# Patient Record
Sex: MALE | Race: WHITE | NOT HISPANIC OR LATINO | Employment: OTHER | URBAN - METROPOLITAN AREA
[De-identification: names, ages, dates, MRNs, and addresses within clinical notes are randomized per-mention and may not be internally consistent; named-entity substitution may affect disease eponyms.]

---

## 2023-09-05 PROBLEM — I10 ESSENTIAL HYPERTENSION: Status: ACTIVE | Noted: 2023-09-05

## 2023-09-05 PROBLEM — R60.9 PERIPHERAL EDEMA: Status: ACTIVE | Noted: 2023-09-05

## 2023-09-05 PROBLEM — I48.91 ATRIAL FIBRILLATION (MULTI): Status: ACTIVE | Noted: 2023-09-05

## 2023-09-05 PROBLEM — I63.9 STROKE (MULTI): Status: ACTIVE | Noted: 2023-09-05

## 2023-09-05 PROBLEM — C61 MALIGNANT TUMOR OF PROSTATE (MULTI): Status: ACTIVE | Noted: 2023-09-05

## 2023-09-05 PROBLEM — R60.0 PERIPHERAL EDEMA: Status: ACTIVE | Noted: 2023-09-05

## 2023-09-05 RX ORDER — METOPROLOL TARTRATE 25 MG/1
0.5 TABLET, FILM COATED ORAL 2 TIMES DAILY
COMMUNITY

## 2023-09-05 RX ORDER — ENOXAPARIN SODIUM 100 MG/ML
70 INJECTION SUBCUTANEOUS 2 TIMES DAILY
COMMUNITY
End: 2023-11-12 | Stop reason: HOSPADM

## 2023-09-05 RX ORDER — ACETAMINOPHEN 325 MG/1
650 TABLET ORAL EVERY 4 HOURS PRN
COMMUNITY

## 2023-09-05 RX ORDER — METRONIDAZOLE 7.5 MG/G
1 GEL TOPICAL 2 TIMES DAILY
COMMUNITY

## 2023-09-05 RX ORDER — WARFARIN 3 MG/1
3 TABLET ORAL
COMMUNITY
End: 2023-11-12 | Stop reason: HOSPADM

## 2023-11-09 ENCOUNTER — APPOINTMENT (OUTPATIENT)
Dept: CARDIOLOGY | Facility: HOSPITAL | Age: 88
DRG: 066 | End: 2023-11-09
Payer: MEDICARE

## 2023-11-09 ENCOUNTER — APPOINTMENT (OUTPATIENT)
Dept: RADIOLOGY | Facility: HOSPITAL | Age: 88
DRG: 066 | End: 2023-11-09
Payer: MEDICARE

## 2023-11-09 ENCOUNTER — HOSPITAL ENCOUNTER (INPATIENT)
Facility: HOSPITAL | Age: 88
LOS: 3 days | Discharge: HOME | DRG: 066 | End: 2023-11-12
Attending: EMERGENCY MEDICINE | Admitting: INTERNAL MEDICINE
Payer: MEDICARE

## 2023-11-09 DIAGNOSIS — I48.20 CHRONIC A-FIB (MULTI): ICD-10-CM

## 2023-11-09 DIAGNOSIS — G45.8 OTHER TRANSIENT CEREBRAL ISCHEMIC ATTACKS AND RELATED SYNDROMES: ICD-10-CM

## 2023-11-09 DIAGNOSIS — R09.89 SUSPECTED CEREBROVASCULAR ACCIDENT (CVA): ICD-10-CM

## 2023-11-09 DIAGNOSIS — I63.9 ACUTE CVA (CEREBROVASCULAR ACCIDENT) (MULTI): Primary | ICD-10-CM

## 2023-11-09 LAB
ALBUMIN SERPL-MCNC: 3.4 G/DL (ref 3.5–5)
ALP BLD-CCNC: 49 U/L (ref 35–125)
ALT SERPL-CCNC: 10 U/L (ref 5–40)
ANION GAP SERPL CALC-SCNC: 8 MMOL/L
APTT PPP: 27.5 SECONDS (ref 22–32.5)
AST SERPL-CCNC: 23 U/L (ref 5–40)
BASOPHILS # BLD AUTO: 0.07 X10*3/UL (ref 0–0.1)
BASOPHILS NFR BLD AUTO: 1.2 %
BILIRUB SERPL-MCNC: 0.6 MG/DL (ref 0.1–1.2)
BUN SERPL-MCNC: 28 MG/DL (ref 8–25)
CALCIUM SERPL-MCNC: 8.6 MG/DL (ref 8.5–10.4)
CHLORIDE SERPL-SCNC: 102 MMOL/L (ref 97–107)
CO2 SERPL-SCNC: 26 MMOL/L (ref 24–31)
CREAT SERPL-MCNC: 0.8 MG/DL (ref 0.4–1.6)
EOSINOPHIL # BLD AUTO: 0.2 X10*3/UL (ref 0–0.4)
EOSINOPHIL NFR BLD AUTO: 3.5 %
ERYTHROCYTE [DISTWIDTH] IN BLOOD BY AUTOMATED COUNT: 13.4 % (ref 11.5–14.5)
GFR SERPL CREATININE-BSD FRML MDRD: 84 ML/MIN/1.73M*2
GLUCOSE BLD MANUAL STRIP-MCNC: 75 MG/DL (ref 74–99)
GLUCOSE SERPL-MCNC: 95 MG/DL (ref 65–99)
HCT VFR BLD AUTO: 35.8 % (ref 41–52)
HGB BLD-MCNC: 11.4 G/DL (ref 13.5–17.5)
IMM GRANULOCYTES # BLD AUTO: 0.01 X10*3/UL (ref 0–0.5)
IMM GRANULOCYTES NFR BLD AUTO: 0.2 % (ref 0–0.9)
INR PPP: 1.2 (ref 0.9–1.2)
LYMPHOCYTES # BLD AUTO: 2.07 X10*3/UL (ref 0.8–3)
LYMPHOCYTES NFR BLD AUTO: 36.4 %
MCH RBC QN AUTO: 30.7 PG (ref 26–34)
MCHC RBC AUTO-ENTMCNC: 31.8 G/DL (ref 32–36)
MCV RBC AUTO: 97 FL (ref 80–100)
MONOCYTES # BLD AUTO: 0.64 X10*3/UL (ref 0.05–0.8)
MONOCYTES NFR BLD AUTO: 11.2 %
NEUTROPHILS # BLD AUTO: 2.7 X10*3/UL (ref 1.6–5.5)
NEUTROPHILS NFR BLD AUTO: 47.5 %
NRBC BLD-RTO: 0 /100 WBCS (ref 0–0)
PLATELET # BLD AUTO: 188 X10*3/UL (ref 150–450)
POCT GLUCOSE: 75 MG/DL (ref 74–99)
POTASSIUM SERPL-SCNC: 4.3 MMOL/L (ref 3.4–5.1)
PROT SERPL-MCNC: 6.5 G/DL (ref 5.9–7.9)
PROTHROMBIN TIME: 12.4 SECONDS (ref 9.3–12.7)
RBC # BLD AUTO: 3.71 X10*6/UL (ref 4.5–5.9)
SODIUM SERPL-SCNC: 136 MMOL/L (ref 133–145)
TROPONIN T SERPL-MCNC: 24 NG/L
WBC # BLD AUTO: 5.7 X10*3/UL (ref 4.4–11.3)

## 2023-11-09 PROCEDURE — 2060000001 HC INTERMEDIATE ICU ROOM DAILY

## 2023-11-09 PROCEDURE — 93880 EXTRACRANIAL BILAT STUDY: CPT | Performed by: INTERNAL MEDICINE

## 2023-11-09 PROCEDURE — 99222 1ST HOSP IP/OBS MODERATE 55: CPT | Performed by: INTERNAL MEDICINE

## 2023-11-09 PROCEDURE — 2500000004 HC RX 250 GENERAL PHARMACY W/ HCPCS (ALT 636 FOR OP/ED): Performed by: INTERNAL MEDICINE

## 2023-11-09 PROCEDURE — 2550000001 HC RX 255 CONTRASTS: Performed by: EMERGENCY MEDICINE

## 2023-11-09 PROCEDURE — 70551 MRI BRAIN STEM W/O DYE: CPT

## 2023-11-09 PROCEDURE — 84484 ASSAY OF TROPONIN QUANT: CPT | Performed by: EMERGENCY MEDICINE

## 2023-11-09 PROCEDURE — 85025 COMPLETE CBC W/AUTO DIFF WBC: CPT | Performed by: EMERGENCY MEDICINE

## 2023-11-09 PROCEDURE — 93880 EXTRACRANIAL BILAT STUDY: CPT

## 2023-11-09 PROCEDURE — 70498 CT ANGIOGRAPHY NECK: CPT

## 2023-11-09 PROCEDURE — 99222 1ST HOSP IP/OBS MODERATE 55: CPT

## 2023-11-09 PROCEDURE — 84155 ASSAY OF PROTEIN SERUM: CPT | Performed by: EMERGENCY MEDICINE

## 2023-11-09 PROCEDURE — 85730 THROMBOPLASTIN TIME PARTIAL: CPT | Performed by: EMERGENCY MEDICINE

## 2023-11-09 PROCEDURE — 36415 COLL VENOUS BLD VENIPUNCTURE: CPT | Performed by: EMERGENCY MEDICINE

## 2023-11-09 PROCEDURE — 70450 CT HEAD/BRAIN W/O DYE: CPT

## 2023-11-09 PROCEDURE — 82947 ASSAY GLUCOSE BLOOD QUANT: CPT

## 2023-11-09 PROCEDURE — 85610 PROTHROMBIN TIME: CPT | Performed by: EMERGENCY MEDICINE

## 2023-11-09 PROCEDURE — 99285 EMERGENCY DEPT VISIT HI MDM: CPT | Mod: 25 | Performed by: EMERGENCY MEDICINE

## 2023-11-09 PROCEDURE — 2500000001 HC RX 250 WO HCPCS SELF ADMINISTERED DRUGS (ALT 637 FOR MEDICARE OP): Performed by: INTERNAL MEDICINE

## 2023-11-09 RX ORDER — ACETAMINOPHEN 325 MG/1
650 TABLET ORAL EVERY 4 HOURS PRN
Status: DISCONTINUED | OUTPATIENT
Start: 2023-11-09 | End: 2023-11-12 | Stop reason: HOSPADM

## 2023-11-09 RX ORDER — PANTOPRAZOLE SODIUM 40 MG/10ML
40 INJECTION, POWDER, LYOPHILIZED, FOR SOLUTION INTRAVENOUS DAILY
Status: DISCONTINUED | OUTPATIENT
Start: 2023-11-09 | End: 2023-11-12 | Stop reason: HOSPADM

## 2023-11-09 RX ORDER — DOCUSATE SODIUM 100 MG/1
100 CAPSULE, LIQUID FILLED ORAL 2 TIMES DAILY
Status: DISCONTINUED | OUTPATIENT
Start: 2023-11-09 | End: 2023-11-12 | Stop reason: HOSPADM

## 2023-11-09 RX ORDER — ICOSAPENT ETHYL 1 G/1
1 CAPSULE ORAL DAILY
Status: DISCONTINUED | OUTPATIENT
Start: 2023-11-09 | End: 2023-11-12 | Stop reason: HOSPADM

## 2023-11-09 RX ORDER — METOPROLOL TARTRATE 25 MG/1
12.5 TABLET, FILM COATED ORAL 2 TIMES DAILY
Status: DISCONTINUED | OUTPATIENT
Start: 2023-11-09 | End: 2023-11-12 | Stop reason: HOSPADM

## 2023-11-09 RX ORDER — PANTOPRAZOLE SODIUM 40 MG/1
40 TABLET, DELAYED RELEASE ORAL DAILY
Status: DISCONTINUED | OUTPATIENT
Start: 2023-11-09 | End: 2023-11-12 | Stop reason: HOSPADM

## 2023-11-09 RX ORDER — ACETAMINOPHEN 325 MG/1
650 TABLET ORAL EVERY 6 HOURS PRN
Status: DISCONTINUED | OUTPATIENT
Start: 2023-11-09 | End: 2023-11-09

## 2023-11-09 RX ADMIN — DOCUSATE SODIUM 100 MG: 100 CAPSULE, LIQUID FILLED ORAL at 20:15

## 2023-11-09 RX ADMIN — PANTOPRAZOLE SODIUM 40 MG: 40 TABLET, DELAYED RELEASE ORAL at 15:15

## 2023-11-09 RX ADMIN — METOPROLOL TARTRATE 12.5 MG: 25 TABLET, FILM COATED ORAL at 15:16

## 2023-11-09 RX ADMIN — APIXABAN 5 MG: 5 TABLET, FILM COATED ORAL at 15:15

## 2023-11-09 RX ADMIN — METOPROLOL TARTRATE 12.5 MG: 25 TABLET, FILM COATED ORAL at 20:14

## 2023-11-09 RX ADMIN — ACETAMINOPHEN 650 MG: 325 TABLET ORAL at 23:09

## 2023-11-09 RX ADMIN — IOHEXOL 75 ML: 350 INJECTION, SOLUTION INTRAVENOUS at 03:35

## 2023-11-09 RX ADMIN — DOCUSATE SODIUM 100 MG: 100 CAPSULE, LIQUID FILLED ORAL at 15:15

## 2023-11-09 RX ADMIN — APIXABAN 5 MG: 5 TABLET, FILM COATED ORAL at 20:15

## 2023-11-09 SDOH — SOCIAL STABILITY: SOCIAL INSECURITY: DO YOU FEEL UNSAFE GOING BACK TO THE PLACE WHERE YOU ARE LIVING?: NO

## 2023-11-09 SDOH — SOCIAL STABILITY: SOCIAL INSECURITY: ABUSE: ADULT

## 2023-11-09 SDOH — SOCIAL STABILITY: SOCIAL INSECURITY: DOES ANYONE TRY TO KEEP YOU FROM HAVING/CONTACTING OTHER FRIENDS OR DOING THINGS OUTSIDE YOUR HOME?: NO

## 2023-11-09 SDOH — SOCIAL STABILITY: SOCIAL INSECURITY: HAS ANYONE EVER THREATENED TO HURT YOUR FAMILY OR YOUR PETS?: NO

## 2023-11-09 SDOH — SOCIAL STABILITY: SOCIAL INSECURITY: ARE YOU OR HAVE YOU BEEN THREATENED OR ABUSED PHYSICALLY, EMOTIONALLY, OR SEXUALLY BY ANYONE?: NO

## 2023-11-09 SDOH — SOCIAL STABILITY: SOCIAL INSECURITY: HAVE YOU HAD THOUGHTS OF HARMING ANYONE ELSE?: NO

## 2023-11-09 SDOH — SOCIAL STABILITY: SOCIAL INSECURITY: DO YOU FEEL ANYONE HAS EXPLOITED OR TAKEN ADVANTAGE OF YOU FINANCIALLY OR OF YOUR PERSONAL PROPERTY?: NO

## 2023-11-09 SDOH — SOCIAL STABILITY: SOCIAL INSECURITY: WERE YOU ABLE TO COMPLETE ALL THE BEHAVIORAL HEALTH SCREENINGS?: YES

## 2023-11-09 SDOH — SOCIAL STABILITY: SOCIAL INSECURITY: ARE THERE ANY APPARENT SIGNS OF INJURIES/BEHAVIORS THAT COULD BE RELATED TO ABUSE/NEGLECT?: NO

## 2023-11-09 ASSESSMENT — ENCOUNTER SYMPTOMS
FREQUENCY: 0
ARTHRALGIAS: 0
EYE DISCHARGE: 0
SLEEP DISTURBANCE: 0
APPETITE CHANGE: 0
BRUISES/BLEEDS EASILY: 0
DIAPHORESIS: 0
NECK PAIN: 0
CHEST TIGHTNESS: 0
SORE THROAT: 0
COUGH: 0
DYSPHORIC MOOD: 0
HEADACHES: 0
MYALGIAS: 0
LIGHT-HEADEDNESS: 0
JOINT SWELLING: 0
FACIAL SWELLING: 0
COLOR CHANGE: 0
RHINORRHEA: 0
SPEECH DIFFICULTY: 0
ACTIVITY CHANGE: 0
FATIGUE: 0
PALPITATIONS: 0
WHEEZING: 0
DIZZINESS: 0
RECTAL PAIN: 0
DIARRHEA: 0
ABDOMINAL PAIN: 0
ABDOMINAL DISTENTION: 0
CONFUSION: 0
POLYDIPSIA: 0
ANAL BLEEDING: 0
PHOTOPHOBIA: 0
EYE REDNESS: 0
NECK STIFFNESS: 0
FLANK PAIN: 0
EYE PAIN: 0
UNEXPECTED WEIGHT CHANGE: 0
HALLUCINATIONS: 0
FEVER: 0
AGITATION: 0
FACIAL ASYMMETRY: 0
SINUS PRESSURE: 0
CHOKING: 0
VOMITING: 0
VOICE CHANGE: 0
DECREASED CONCENTRATION: 0
DYSURIA: 0
WEAKNESS: 1
HYPERACTIVE: 0
NERVOUS/ANXIOUS: 0
DIFFICULTY URINATING: 0
APNEA: 0
CHILLS: 0
BLOOD IN STOOL: 0
EYE ITCHING: 0
ADENOPATHY: 0
SEIZURES: 0
TROUBLE SWALLOWING: 0
TREMORS: 0
STRIDOR: 0
CONSTIPATION: 0
SHORTNESS OF BREATH: 0
SINUS PAIN: 0
NUMBNESS: 0
WOUND: 0
NAUSEA: 0
BACK PAIN: 0
HEMATURIA: 0
POLYPHAGIA: 0

## 2023-11-09 ASSESSMENT — ACTIVITIES OF DAILY LIVING (ADL)
FEEDING YOURSELF: INDEPENDENT
WALKS IN HOME: INDEPENDENT
GROOMING: INDEPENDENT
ADEQUATE_TO_COMPLETE_ADL: YES
HEARING - RIGHT EAR: HEARING AID
JUDGMENT_ADEQUATE_SAFELY_COMPLETE_DAILY_ACTIVITIES: YES
HEARING - LEFT EAR: HEARING AID
DRESSING YOURSELF: INDEPENDENT
TOILETING: INDEPENDENT
PATIENT'S MEMORY ADEQUATE TO SAFELY COMPLETE DAILY ACTIVITIES?: YES
BATHING: INDEPENDENT

## 2023-11-09 ASSESSMENT — COGNITIVE AND FUNCTIONAL STATUS - GENERAL
DAILY ACTIVITIY SCORE: 24
DAILY ACTIVITIY SCORE: 24
MOBILITY SCORE: 24
MOBILITY SCORE: 24
PATIENT BASELINE BEDBOUND: NO

## 2023-11-09 ASSESSMENT — PATIENT HEALTH QUESTIONNAIRE - PHQ9
SUM OF ALL RESPONSES TO PHQ9 QUESTIONS 1 & 2: 0
1. LITTLE INTEREST OR PLEASURE IN DOING THINGS: NOT AT ALL
2. FEELING DOWN, DEPRESSED OR HOPELESS: NOT AT ALL

## 2023-11-09 ASSESSMENT — LIFESTYLE VARIABLES
EVER HAD A DRINK FIRST THING IN THE MORNING TO STEADY YOUR NERVES TO GET RID OF A HANGOVER: NO
HOW OFTEN DO YOU HAVE A DRINK CONTAINING ALCOHOL: 2-4 TIMES A MONTH
SKIP TO QUESTIONS 9-10: 1
AUDIT-C TOTAL SCORE: 2
REASON UNABLE TO ASSESS: NO
HAVE YOU EVER FELT YOU SHOULD CUT DOWN ON YOUR DRINKING: NO
HOW MANY STANDARD DRINKS CONTAINING ALCOHOL DO YOU HAVE ON A TYPICAL DAY: 1 OR 2
HAVE PEOPLE ANNOYED YOU BY CRITICIZING YOUR DRINKING: NO
HOW OFTEN DO YOU HAVE 6 OR MORE DRINKS ON ONE OCCASION: NEVER
EVER FELT BAD OR GUILTY ABOUT YOUR DRINKING: NO
PRESCIPTION_ABUSE_PAST_12_MONTHS: NO
AUDIT-C TOTAL SCORE: 2
SUBSTANCE_ABUSE_PAST_12_MONTHS: NO

## 2023-11-09 ASSESSMENT — COLUMBIA-SUICIDE SEVERITY RATING SCALE - C-SSRS
2. HAVE YOU ACTUALLY HAD ANY THOUGHTS OF KILLING YOURSELF?: NO
6. HAVE YOU EVER DONE ANYTHING, STARTED TO DO ANYTHING, OR PREPARED TO DO ANYTHING TO END YOUR LIFE?: NO
1. IN THE PAST MONTH, HAVE YOU WISHED YOU WERE DEAD OR WISHED YOU COULD GO TO SLEEP AND NOT WAKE UP?: NO

## 2023-11-09 ASSESSMENT — PAIN - FUNCTIONAL ASSESSMENT
PAIN_FUNCTIONAL_ASSESSMENT: 0-10

## 2023-11-09 ASSESSMENT — PAIN SCALES - GENERAL
PAINLEVEL_OUTOF10: 0 - NO PAIN

## 2023-11-09 ASSESSMENT — PAIN DESCRIPTION - PROGRESSION: CLINICAL_PROGRESSION: RAPIDLY IMPROVING

## 2023-11-09 NOTE — CONSULTS
Neurology Consult    History Of Present Illness  Alvin Whitmore is a 89 y.o. male with listed PMHX, coming for evaluation of stroke sympt's.  Hx is obtained from chart and patient.   Pat mentions that last night was watching TV and felt asleep about 11:00 pm feeling ok.  Woke up around 12:00 am noticing severe Lt UE numbness/weakness w/ poor fine motor movements.  He felted that maybe was positional, but sympt's persisted by trying to move UE.  Also noted slurred speech which made him call 911.  Pat was brought to ER by EMS and felted that by time came he was ER, sympt's were progressively improving.  At ER there was noted Lt facial droop that he was not aware of.  He came in stroke code, but found not to be an IV thrombolysis candidate due to chronic intake of Eliquis for atrial fibrillation w/ good compliance.  Denies assoc. HA, n/v, vision changes.  Feels some chest discomfort and subtle SOB.  He has a previous stroke w/ some mild residual Lt hand numbness.       Past Medical History  Past Medical History:   Diagnosis Date    Atrial fibrillation (CMS/HCC)     Cancer (CMS/HCC)     Hypertension     TIA (transient ischemic attack) 01/01/1996     Surgical History  Past Surgical History:   Procedure Laterality Date    MR HEAD ANGIO WO IV CONTRAST  12/21/2017    MR HEAD ANGIO WO IV CONTRAST [facility]     Social History  Social History     Tobacco Use    Smoking status: Never    Smokeless tobacco: Never   Vaping Use    Vaping Use: Never used   Substance Use Topics    Alcohol use: Yes     Alcohol/week: 1.0 standard drink of alcohol     Types: 1 Glasses of wine per week    Drug use: Never     Allergies  Patient has no known allergies.  Medications Prior to Admission   Medication Sig Dispense Refill Last Dose    acetaminophen (Tylenol) 325 mg tablet Take 2 tablets (650 mg) by mouth every 4 hours if needed for mild pain (1 - 3) or fever (temp greater than 38.0 C).       apixaban (Eliquis) 5 mg tablet Take 1  tablet (5 mg) by mouth 2 times a day.   11/8/2023 at 1900    enoxaparin (Lovenox) 80 mg/0.8 mL syringe Inject 0.7 mL (70 mg) under the skin 2 times a day. Additional Instructions: WATCH FOR REDUCED PLATELET COUNTS. CONTACT PRESCRIBER IF PLATELETS ARE TRENDING DOWN FOR AT LEAST 72 HOURS.       fish oil concentrate (Omega-3) 120-180 mg capsule Take 1 capsule (1 g) by mouth once daily.   11/8/2023    metoprolol tartrate (Lopressor) 25 mg tablet Take 0.5 tablets (12.5 mg) by mouth 2 times a day. With food   11/8/2023 at 1900    metroNIDAZOLE (Metrogel) 0.75 % gel 1 Application 2 times a day.       warfarin (Coumadin) 3 mg tablet Take 1 tablet (3 mg) by mouth. Daily          ROS: See hpi and H&P  Neurological Exam  Physical Exam  General:  lying in bed, alone, cooperative to the interview and physical exam, no distress, good historian for age.    Head and Neck:  atraumatic; no carotids bruits audible bilaterally    Heart:  regular sinus rhythm to auscultation.    Mental Status:  Alert, oriented to person, place and time fully, follow simple and complex commands, no right to left confusion, normal speech fluency and comprehension, normal naming and repetition, normal fund of knowledge.     Cranial Nerves II-XII:  positive papillary reactivity to light and accommodation from 3mm to 2 mm bilaterally, normal extraocular movements, no visual field deficits to confrontational testing, normal bilateral facial sensation to light touch at all quadrants, normal jaw muscle strength, normal bilateral facial grimace, decreased hearing to conversation, normal tongue protrusion and palate elevation, normal shoulder shrug.     Motor:  No drift in bilateral upper extremities, muscle strength (MRC score) is 5/5 at both upper and lower extremities, normal tone.   Coordination:  Normal finger to nose and rapid alternating movements to finger tap bilaterally.     Reflexes:  +2/4 at all ext’s; plantar reflex seems to be flexor, but there's  "excessive withdrawal.    Sensory:  Normal light touch in both upper and lower extremities.     Gait: deferred.       Last Recorded Vitals  Blood pressure 114/72, pulse 64, temperature 36.2 °C (97.2 °F), temperature source Temporal, resp. rate 18, height 1.803 m (5' 10.98\"), weight 78.1 kg (172 lb 2.9 oz), SpO2 96 %.    Relevant Results  Results for orders placed or performed during the hospital encounter of 11/09/23 (from the past 96 hour(s))   CBC and Auto Differential   Result Value Ref Range    WBC 5.7 4.4 - 11.3 x10*3/uL    nRBC 0.0 0.0 - 0.0 /100 WBCs    RBC 3.71 (L) 4.50 - 5.90 x10*6/uL    Hemoglobin 11.4 (L) 13.5 - 17.5 g/dL    Hematocrit 35.8 (L) 41.0 - 52.0 %    MCV 97 80 - 100 fL    MCH 30.7 26.0 - 34.0 pg    MCHC 31.8 (L) 32.0 - 36.0 g/dL    RDW 13.4 11.5 - 14.5 %    Platelets 188 150 - 450 x10*3/uL    Neutrophils % 47.5 40.0 - 80.0 %    Immature Granulocytes %, Automated 0.2 0.0 - 0.9 %    Lymphocytes % 36.4 13.0 - 44.0 %    Monocytes % 11.2 2.0 - 10.0 %    Eosinophils % 3.5 0.0 - 6.0 %    Basophils % 1.2 0.0 - 2.0 %    Neutrophils Absolute 2.70 1.60 - 5.50 x10*3/uL    Immature Granulocytes Absolute, Automated 0.01 0.00 - 0.50 x10*3/uL    Lymphocytes Absolute 2.07 0.80 - 3.00 x10*3/uL    Monocytes Absolute 0.64 0.05 - 0.80 x10*3/uL    Eosinophils Absolute 0.20 0.00 - 0.40 x10*3/uL    Basophils Absolute 0.07 0.00 - 0.10 x10*3/uL   Comprehensive metabolic panel   Result Value Ref Range    Glucose 95 65 - 99 mg/dL    Sodium 136 133 - 145 mmol/L    Potassium 4.3 3.4 - 5.1 mmol/L    Chloride 102 97 - 107 mmol/L    Bicarbonate 26 24 - 31 mmol/L    Urea Nitrogen 28 (H) 8 - 25 mg/dL    Creatinine 0.80 0.40 - 1.60 mg/dL    eGFR 84 >60 mL/min/1.73m*2    Calcium 8.6 8.5 - 10.4 mg/dL    Albumin 3.4 (L) 3.5 - 5.0 g/dL    Alkaline Phosphatase 49 35 - 125 U/L    Total Protein 6.5 5.9 - 7.9 g/dL    AST 23 5 - 40 U/L    Bilirubin, Total 0.6 0.1 - 1.2 mg/dL    ALT 10 5 - 40 U/L    Anion Gap 8 <=19 mmol/L   Troponin T, " High Sensitivity   Result Value Ref Range    Troponin T, High Sensitivity 24 (H) <=15 ng/L   POCT GLUCOSE   Result Value Ref Range    POCT Glucose 75 74 - 99 mg/dL   POCT glucose   Result Value Ref Range    POCT Glucose 75 74 - 99 mg/dL   Protime-INR   Result Value Ref Range    Protime 12.4 9.3 - 12.7 seconds    INR 1.2 0.9 - 1.2   APTT   Result Value Ref Range    aPTT 27.5 22.0 - 32.5 seconds     CT brain attack head wo IV contrast  Result Date: 11/9/2023  1. No acute intracranial findings. 2. Remote right posterior parietal lobe infarct.      CT angio head neck w and wo IV contrast  Result Date: 11/9/2023  No aneurysms, stenosis or occlusion of the proximal intracranial vasculature. No carotid or vertebral stenosis.    Carotid duplex bilateral  Result Date: 11/9/2023  PRELIMINARY CONCLUSIONS:  Right Carotid: Findings are consistent with less than 50% stenosis of the right proximal internal carotid artery. Laminar flow seen by color Doppler. Right external carotid artery appears patent with no evidence of stenosis. The right vertebral artery is patent with antegrade flow. No evidence of hemodynamically significant stenosis in the right subclavian artery. Left Carotid: Findings are consistent with less than 50% stenosis of the left proximal internal carotid artery. Left external carotid artery appears patent with no evidence of stenosis. The left vertebral artery is patent with antegrade flow. No evidence of hemodynamically significant stenosis in the left subclavian artery.  Imaging & Doppler Findings: Right Plaque Morph: The distal right common carotid artery demonstrates heterogenous plaque.        Assessment/Plan   Principal Problem:    Acute CVA (cerebrovascular accident) (CMS/HCC)  Active Problems:    Atrial fibrillation (CMS/HCC)    Essential hypertension    Impression:  Stroke-like sympt's in ? If TIA vs small lacunar (sympt's resolved).  TIA/stroke work up in progress.  Will complement w/ some labs.  For  now to cont. Current tx.       Recommendations/Plan:  Check MRI brain w/o cont.   Check b12, tsh, lipids, hgba1c.     3.   Check echo and telemetry (as per primary medical team),   4.   Control of all vascular risk factors, statin and eliquis as outpat.   5.   Avoid hypotension.    6.   DVT prophylaxis as per primary medical team.   7.   Follow to cont.     James Rolon MD

## 2023-11-09 NOTE — H&P
History Of Present Illness  Alvin Whitmore is a 89 y.o. male presenting with left arm weakness.  Patient lives home alone.  He has missed taking Eliquis few times.  He was watching TV and fall asleep.  When he woke up he noticed he could not lift his left arm.  He called 911.  He also noticed some left face numbness and slurred speech.  But his symptoms started resolving by the time he was in the emergency room.  He is now feeling back to his baseline.  He has history of multiple TIAs in the past     Past Medical History  Past Medical History:   Diagnosis Date    Atrial fibrillation (CMS/HCC)     Cancer (CMS/HCC)     Hypertension     TIA (transient ischemic attack) 01/01/1996   History of prostate cancer with seed    Surgical History  Past Surgical History:   Procedure Laterality Date    MR HEAD ANGIO WO IV CONTRAST  12/21/2017    MR HEAD ANGIO WO IV CONTRAST [facility]        Social History  He reports that he has never smoked. He has never used smokeless tobacco. He reports current alcohol use of about 1.0 standard drink of alcohol per week. He reports that he does not use drugs.    Family History  Family History   Problem Relation Name Age of Onset    No Known Problems Mother      No Known Problems Father     Lives alone     Allergies  Patient has no known allergies.    Review of Systems   Constitutional:  Negative for activity change, appetite change, chills, diaphoresis, fatigue, fever and unexpected weight change.   HENT:  Negative for congestion, dental problem, drooling, ear discharge, ear pain, facial swelling, hearing loss, mouth sores, nosebleeds, postnasal drip, rhinorrhea, sinus pressure, sinus pain, sneezing, sore throat, tinnitus, trouble swallowing and voice change.    Eyes:  Negative for photophobia, pain, discharge, redness, itching and visual disturbance.   Respiratory:  Negative for apnea, cough, choking, chest tightness, shortness of breath, wheezing and stridor.    Cardiovascular:   Negative for chest pain, palpitations and leg swelling.   Gastrointestinal:  Negative for abdominal distention, abdominal pain, anal bleeding, blood in stool, constipation, diarrhea, nausea, rectal pain and vomiting.   Endocrine: Negative for cold intolerance, heat intolerance, polydipsia, polyphagia and polyuria.   Genitourinary:  Negative for decreased urine volume, difficulty urinating, dysuria, enuresis, flank pain, frequency, genital sores, hematuria and urgency.   Musculoskeletal:  Negative for arthralgias, back pain, gait problem, joint swelling, myalgias, neck pain and neck stiffness.   Skin:  Negative for color change, pallor, rash and wound.   Allergic/Immunologic: Negative for environmental allergies, food allergies and immunocompromised state.   Neurological:  Positive for weakness. Negative for dizziness, tremors, seizures, syncope, facial asymmetry, speech difficulty, light-headedness, numbness and headaches.   Hematological:  Negative for adenopathy. Does not bruise/bleed easily.   Psychiatric/Behavioral:  Negative for agitation, behavioral problems, confusion, decreased concentration, dysphoric mood, hallucinations, self-injury, sleep disturbance and suicidal ideas. The patient is not nervous/anxious and is not hyperactive.         Physical Exam  Vitals reviewed.   Constitutional:       Appearance: Normal appearance.   HENT:      Head: Normocephalic and atraumatic.      Right Ear: Tympanic membrane, ear canal and external ear normal.      Left Ear: Tympanic membrane, ear canal and external ear normal.      Nose: Nose normal.      Mouth/Throat:      Pharynx: Oropharynx is clear.   Eyes:      Extraocular Movements: Extraocular movements intact.      Conjunctiva/sclera: Conjunctivae normal.      Pupils: Pupils are equal, round, and reactive to light.   Cardiovascular:      Rate and Rhythm: Normal rate and regular rhythm.      Pulses: Normal pulses.      Heart sounds: Normal heart sounds.   Pulmonary:     "  Effort: Pulmonary effort is normal.      Breath sounds: Normal breath sounds.   Abdominal:      General: Abdomen is flat. Bowel sounds are normal.      Palpations: Abdomen is soft.   Musculoskeletal:      Cervical back: Normal range of motion and neck supple.   Skin:     General: Skin is warm and dry.   Neurological:      General: No focal deficit present.      Mental Status: He is alert and oriented to person, place, and time.   Psychiatric:         Mood and Affect: Mood normal.          Last Recorded Vitals  Blood pressure 119/74, pulse 62, temperature 35.6 °C (96.1 °F), temperature source Temporal, resp. rate 15, height 1.803 m (5' 10.98\"), weight 78.1 kg (172 lb 2.9 oz), SpO2 100 %.    Relevant Results        Scheduled medications  apixaban, 5 mg, oral, BID  icosapent ethyL, 1 capsule, oral, Daily  metoprolol tartrate, 12.5 mg, oral, BID      Continuous medications     PRN medications  PRN medications: acetaminophen  Results for orders placed or performed during the hospital encounter of 11/09/23 (from the past 24 hour(s))   CBC and Auto Differential   Result Value Ref Range    WBC 5.7 4.4 - 11.3 x10*3/uL    nRBC 0.0 0.0 - 0.0 /100 WBCs    RBC 3.71 (L) 4.50 - 5.90 x10*6/uL    Hemoglobin 11.4 (L) 13.5 - 17.5 g/dL    Hematocrit 35.8 (L) 41.0 - 52.0 %    MCV 97 80 - 100 fL    MCH 30.7 26.0 - 34.0 pg    MCHC 31.8 (L) 32.0 - 36.0 g/dL    RDW 13.4 11.5 - 14.5 %    Platelets 188 150 - 450 x10*3/uL    Neutrophils % 47.5 40.0 - 80.0 %    Immature Granulocytes %, Automated 0.2 0.0 - 0.9 %    Lymphocytes % 36.4 13.0 - 44.0 %    Monocytes % 11.2 2.0 - 10.0 %    Eosinophils % 3.5 0.0 - 6.0 %    Basophils % 1.2 0.0 - 2.0 %    Neutrophils Absolute 2.70 1.60 - 5.50 x10*3/uL    Immature Granulocytes Absolute, Automated 0.01 0.00 - 0.50 x10*3/uL    Lymphocytes Absolute 2.07 0.80 - 3.00 x10*3/uL    Monocytes Absolute 0.64 0.05 - 0.80 x10*3/uL    Eosinophils Absolute 0.20 0.00 - 0.40 x10*3/uL    Basophils Absolute 0.07 0.00 - " 0.10 x10*3/uL   Comprehensive metabolic panel   Result Value Ref Range    Glucose 95 65 - 99 mg/dL    Sodium 136 133 - 145 mmol/L    Potassium 4.3 3.4 - 5.1 mmol/L    Chloride 102 97 - 107 mmol/L    Bicarbonate 26 24 - 31 mmol/L    Urea Nitrogen 28 (H) 8 - 25 mg/dL    Creatinine 0.80 0.40 - 1.60 mg/dL    eGFR 84 >60 mL/min/1.73m*2    Calcium 8.6 8.5 - 10.4 mg/dL    Albumin 3.4 (L) 3.5 - 5.0 g/dL    Alkaline Phosphatase 49 35 - 125 U/L    Total Protein 6.5 5.9 - 7.9 g/dL    AST 23 5 - 40 U/L    Bilirubin, Total 0.6 0.1 - 1.2 mg/dL    ALT 10 5 - 40 U/L    Anion Gap 8 <=19 mmol/L   Troponin T, High Sensitivity   Result Value Ref Range    Troponin T, High Sensitivity 24 (H) <=15 ng/L   POCT GLUCOSE   Result Value Ref Range    POCT Glucose 75 74 - 99 mg/dL   POCT glucose   Result Value Ref Range    POCT Glucose 75 74 - 99 mg/dL   Protime-INR   Result Value Ref Range    Protime 12.4 9.3 - 12.7 seconds    INR 1.2 0.9 - 1.2   APTT   Result Value Ref Range    aPTT 27.5 22.0 - 32.5 seconds     CT brain attack head wo IV contrast    Result Date: 11/9/2023  Interpreted By:  Winston Latif, STUDY: CT BRAIN ATTACK HEAD WO IV CONTRAST; 11/9/2023 3:33 am   INDICATION: Signs/Symptoms:Stroke Evaluation; /left-sided weakness and slurred speech/history of TIA/brain attack   COMPARISON: 12/20/2017   ACCESSION NUMBER(S): [id number]   ORDERING CLINICIAN: RICHARD MORROW   TECHNIQUE: Contiguous axial images were acquired from the vertex through the posterior fossa without IV contrast.   All CT examinations are performed with one or more of the following dose reduction techniques: Automated Exposure Control, adjustment of mA and/or kV according to patient size, or use of iterative reconstruction techniques.   FINDINGS: No focal mass effect or midline shift is identified. The ventricles and sulci are symmetric and appropriate for the patient's age. However, chronic involutional change of the cerebral hemispheres is noted.    Encephalomalacia is seen the posterior right parietal region likely from the sequela of remote infarct. Nonspecific hypodensities are identified within the periventricular and subcortical white matter likely due to chronic postischemic white matter disease. Atherosclerotic calcifications are seen within the carotid siphons and vertebral arteries.   No acute intracranial hemorrhage is identified. No intra-axial or extra-axial fluid collection is seen.   The visualized paranasal sinuses and mastoid air cells are clear.       1. No acute intracranial findings. 2. Remote right posterior parietal lobe infarct.   . .   This report is in agreement with the preliminary report issued by [facility].   MACRO: None   Signed by: Winston Latif 11/9/2023 8:12 AM Dictation workstation:   [id number]    CT angio head neck w and wo IV contrast    Result Date: 11/9/2023  Interpreted By:  Maddy Owens, STUDY: CT ANGIO HEAD NECK W AND WO IV CONTRAST;  11/9/2023 3:34 am   INDICATION: Signs/Symptoms:Stroke Evaluation with VAN Positive.   COMPARISON: Head CT dated same day.   ACCESSION NUMBER(S): [id number]   ORDERING CLINICIAN: RICHARD MORROW   TECHNIQUE: Unenhanced CT images of the head were obtained. Subsequently,   was administered intravenously and axial images of the head and neck were acquired.  Coronal, sagittal, and 3-D reconstructions were provided for review.   FINDINGS:     CTA HEAD FINDINGS:   No aneurysms, stenosis or occlusion of the proximal intracranial vasculature.   CTA NECK FINDINGS:   No carotid or vertebral stenosis.   Soft tissues of the neck do not demonstrate any suspicious findings. There are diffuse ground-glass opacities involving the lung apices.   Paranasal sinuses and bilateral mastoid air cells are clear.       No aneurysms, stenosis or occlusion of the proximal intracranial vasculature. No carotid or vertebral stenosis.   MACRO: None   Signed by: Maddy Owens 11/9/2023 8:12 AM Dictation  workstation:   [id number]        Assessment/Plan   Principal Problem:    Acute CVA (cerebrovascular accident) (CMS/HCC)  Active Problems:    Atrial fibrillation (CMS/HCC)    Essential hypertension      MRI of the brain  Ultrasound carotid  2D echo  Cardiology consult  Neurology consult  Home medications  DVT prophylaxis patient already on Eliqui       I spent  minutes in the professional and overall care of this patient.      Kati Langley MD

## 2023-11-09 NOTE — CONSULTS
"Inpatient consult to Cardiology  Consult performed by: LONDON Hyatt-CNP  Consult ordered by: Kati Langley MD  Reason for consult: CVA        History Of Present Illness:    Alvin Whitmore is a 89 y.o. male presenting with left sided weakness and numbness.  Patient is current with Dr. Gleason for cardiology.  He has a past medical history of atrial fibrillation, hypertension, TIA, and prostate cancer.  Patient currently takes Eliquis for stroke risk reduction and states that he has missed a few doses of Eliquis recently.  Last night he began having numbness and weakness in his left arm.  Lab work was completed in the emergency department showing a sodium of 136, potassium of 4.3, creatinine of 0.80 and hemoglobin of 11.4.  His troponin was slightly elevated at 24.  CT of the head showed no acute intracranial processes.  EKG completed in the emergency department showed a slow atrial fibrillation with a ventricular rate of 59 and no acute ST or T wave changes.  Patient was admitted to the hospital for further assessment and treatment.     Last Recorded Vitals:  Vitals:    11/09/23 0857 11/09/23 1017 11/09/23 1142 11/09/23 1227   BP: 141/73 122/75 129/78 119/74   BP Location:   Left arm Left arm   Patient Position:   Lying Lying   Pulse: 57 60 78 62   Resp: 18 18 17 15   Temp:  37 °C (98.6 °F) 36.5 °C (97.7 °F) 35.6 °C (96.1 °F)   TempSrc:   Oral Temporal   SpO2: 98% 98%  100%   Weight:   78.1 kg (172 lb 2.9 oz)    Height:   1.803 m (5' 10.98\")        Last Labs:  CBC - 11/9/2023:  3:28 AM  5.7 11.4 188    35.8      CMP - 11/9/2023:  3:28 AM  8.6 6.5 23 --- 0.6   _ 3.4 10 49      PTT - 11/9/2023:  6:41 AM  1.2   12.4 27.5     No results found for: \"TROPHS\", \"BNP\", \"HGBA1C\", \"LDLCALC\", \"VLDL\"   Last I/O:  No intake/output data recorded.      Past Medical History:  He has a past medical history of Atrial fibrillation (CMS/HCC), Cancer (CMS/HCC), Hypertension, and TIA (transient ischemic attack) " (01/01/1996).    Past Surgical History:  He has a past surgical history that includes MR angio head wo IV contrast (12/21/2017).      Social History:  He reports that he has never smoked. He has never used smokeless tobacco. He reports current alcohol use of about 1.0 standard drink of alcohol per week. He reports that he does not use drugs.    Family History:  Family History   Problem Relation Name Age of Onset    No Known Problems Mother      No Known Problems Father          Allergies:  Patient has no known allergies.    Inpatient Medications:  Scheduled medications   Medication Dose Route Frequency    apixaban  5 mg oral BID    docusate sodium  100 mg oral BID    icosapent ethyL  1 capsule oral Daily    metoprolol tartrate  12.5 mg oral BID    pantoprazole  40 mg oral Daily    Or    pantoprazole  40 mg intravenous Daily     PRN medications   Medication    acetaminophen     Continuous Medications   Medication Dose Last Rate     Outpatient Medications:  Current Outpatient Medications   Medication Instructions    acetaminophen (TYLENOL) 650 mg, oral, Every 4 hours PRN    apixaban (ELIQUIS) 5 mg, oral, 2 times daily    enoxaparin (LOVENOX) 70 mg, subcutaneous, 2 times daily, Additional Instructions: WATCH FOR REDUCED PLATELET COUNTS. CONTACT PRESCRIBER IF PLATELETS ARE TRENDING DOWN FOR AT LEAST 72 HOURS.    fish oil concentrate (Omega-3) 120-180 mg capsule 1 capsule, oral, Daily    metoprolol tartrate (Lopressor) 25 mg tablet 0.5 tablets, oral, 2 times daily, With food    metroNIDAZOLE (Metrogel) 0.75 % gel 1 Application, 2 times daily    warfarin (COUMADIN) 3 mg, oral, Daily       Physical Exam:  General: alert, oriented x3, pleasant   Heart: S1/S2, Rate 60, Rhythm regular, no s3 or s4, no murmur, thrill, or heaves at PMI.   Lungs: Clear, equal expansion and excursion, no wheezes, crackles, rhales or rhonci.  No conversational dyspnea noted  Abdomen: bowel sounds x 4, soft, non-tender  Genitourinary: deferred    Extremities: No edema noted to the bilateral lower extremities       Assessment/Plan   CVA  Atrial Fibrillation  Hypertension  TIA    Impression and Plan: 11/9 Patient presented to the emergency department with left arm numbness and weakness.  Patient denies any chest pain, palpitations or pressure.  He denies any shortness of breath.  On arrival to the emergency department CT of the head was completed which was negative for any acute intracranial processes.  Lab work showed a sodium of 136, potassium of 4.3, creatinine of 0.80 and hemoglobin of 11.4.  His troponin was slightly elevated at 24.  EKG completed in the emergency department showed a slow atrial fibrillation with a ventricular rate of 59 and no acute T wave or ST changes.  Patient's blood pressure was slightly elevated on arrival to the emergency department at 150/74.  His most recent recorded blood pressure was normal at 119/74.  Patient is breathing comfortably on room air with pulse oximetry of 100%.  He has no edema to the extremities.  Patient takes Eliquis for stroke risk reduction with his atrial fibrillation however he does state that recently he may have missed a few doses of his Eliquis.  At this time we are waiting on the results of an MRI brain, carotid ultrasound and echocardiogram.  Would continue on patient's current regimen of metoprolol for rate and blood pressure control as well as Eliquis for stroke risk reduction.  We will follow with you.      Code Status:  Full Code    I spent 60 minutes in the professional and overall care of this patient.        Tamra Olivarez, APRN-CNP

## 2023-11-09 NOTE — NURSING NOTE
Pt arrived on floor , hooked to hardwire, oriented to room , vitals taken , bed low and locked call light within reach

## 2023-11-09 NOTE — ED NOTES
This is a 89-year-old male from home by EMS for chief complaint of a stroke alert.  Last known well was 2330 hrs.  The patient was watching TV and that is when he fell asleep.  When he woke up he noticed he could not scratch his head with his left arm.  He called 911.  Here he has some slurred speech and a left-sided facial droop.  He is on Eliquis and metoprolol for atrial fibrillation       PMHX:  Past Medical History:   Diagnosis Date    Atrial fibrillation (CMS/HCC)     Hypertension     TIA (transient ischemic attack) 01/01/1996        No Known Allergies      LABS:   Latest Reference Range & Units 11/09/23 03:28   GLUCOSE 65 - 99 mg/dL 95   SODIUM 133 - 145 mmol/L 136   POTASSIUM 3.4 - 5.1 mmol/L 4.3   CHLORIDE 97 - 107 mmol/L 102   Bicarbonate 24 - 31 mmol/L 26   Anion Gap <=19 mmol/L 8   Blood Urea Nitrogen 8 - 25 mg/dL 28 (H)   Creatinine 0.40 - 1.60 mg/dL 0.80   EGFR >60 mL/min/1.73m*2 84   Calcium 8.5 - 10.4 mg/dL 8.6   Albumin 3.5 - 5.0 g/dL 3.4 (L)   Alkaline Phosphatase 35 - 125 U/L 49   ALT 5 - 40 U/L 10   AST 5 - 40 U/L 23   Bilirubin Total 0.1 - 1.2 mg/dL 0.6   Total Protein 5.9 - 7.9 g/dL 6.5   WBC 4.4 - 11.3 x10*3/uL 5.7   nRBC 0.0 - 0.0 /100 WBCs 0.0   RBC 4.50 - 5.90 x10*6/uL 3.71 (L)   HEMOGLOBIN 13.5 - 17.5 g/dL 11.4 (L)   HEMATOCRIT 41.0 - 52.0 % 35.8 (L)   MCV 80 - 100 fL 97   MCH 26.0 - 34.0 pg 30.7   MCHC 32.0 - 36.0 g/dL 31.8 (L)   RED CELL DISTRIBUTION WIDTH 11.5 - 14.5 % 13.4   Platelets 150 - 450 x10*3/uL 188   (H): Data is abnormally high  (L): Data is abnormally low   Latest Reference Range & Units 11/09/23 06:41   INR 0.9 - 1.2  1.2   Protime 9.3 - 12.7 seconds 12.4   aPTT 22.0 - 32.5 seconds 27.5       PLAN:  ADMIT                 Dena Ren RN  11/09/23 0715

## 2023-11-09 NOTE — ED PROVIDER NOTES
HPI   Chief Complaint   Patient presents with    Stroke       This is a 89-year-old male from home by EMS for chief complaint of a stroke alert.  Last known well was 2330 hrs.  The patient was watching TV and that is when he fell asleep.  When he woke up he noticed he could not scratch his head with his left arm.  He called 911.  Here he has some slurred speech and a left-sided facial droop.  He is on Eliquis and metoprolol for atrial fibrillation.  He has a remote history of prostate cancer.      10 point review of systems reviewed and is negative                          Diandra Coma Scale Score: 15         NIH Stroke Scale: 6 (Simultaneous filing. User may not have seen previous data.)          Patient History   Past Medical History:   Diagnosis Date    Atrial fibrillation (CMS/HCC)     Hypertension     TIA (transient ischemic attack) 01/01/1996     Past Surgical History:   Procedure Laterality Date    MR HEAD ANGIO WO IV CONTRAST  12/21/2017    MR HEAD ANGIO WO IV CONTRAST [facility]     No family history on file.  Social History     Tobacco Use    Smoking status: Unknown    Smokeless tobacco: Not on file   Substance Use Topics    Alcohol use: Defer    Drug use: Not on file       Physical Exam   ED Triage Vitals   Temp Heart Rate Resp BP   11/09/23 0345 11/09/23 0333 11/09/23 0333 11/09/23 0330   36.4 °C (97.6 °F) 60 14 150/74      SpO2 Temp src Heart Rate Source Patient Position   11/09/23 0333 -- -- --   97 %         BP Location FiO2 (%)     -- --             Physical Exam  Vitals and nursing note reviewed.   Constitutional:       Appearance: Normal appearance.   HENT:      Head: Normocephalic and atraumatic.      Nose: Nose normal.      Mouth/Throat:      Mouth: Mucous membranes are moist.   Eyes:      Extraocular Movements: Extraocular movements intact.      Pupils: Pupils are equal, round, and reactive to light.   Cardiovascular:      Rate and Rhythm: Normal rate and regular rhythm.   Pulmonary:       Effort: Pulmonary effort is normal.      Breath sounds: Normal breath sounds.   Abdominal:      General: Abdomen is flat.      Palpations: Abdomen is soft.   Musculoskeletal:         General: Normal range of motion.      Cervical back: Normal range of motion.   Skin:     General: Skin is warm and dry.      Capillary Refill: Capillary refill takes less than 2 seconds.   Neurological:      Mental Status: He is alert.      Comments: See NIH for details total score of 6   Psychiatric:         Mood and Affect: Mood normal.         ED Course & MDM   Diagnoses as of 11/09/23 0424   Acute CVA (cerebrovascular accident) (CMS/[facility])       Medical Decision Making  Medical Decision Making: Patient was worked up with CT as well as CTA of the head.  EMS did state the patient was a little bit improved once they got to the ED.  Glucose is 75.  On exam is completely awake and alert.    I spoke with Dr. Lennon at 0400 hours, who states the patient is not a tnk candidate.     Repeat NIH is still 6.  The patient will be hospitalized for further management and treatment at this time.    [unfilled]     EKG interpreted by myself (ED attending physician): EKG interpreted by me shows a heart rate of 90 normal sinus rhythm with a left axis deviation normal intervals.  Nonspecific T wave abnormality, TX interval 140 ms  ms and QTc is 4 5 2 ms    Differential Diagnoses Considered: Stroke, TIA, hypoglycemia    Chronic Medical Conditions Significantly Affecting Care: On Eliquis    External Records Reviewed: I reviewed recent and relevant outside records including: Previous lab work and notes from urology    Social Determinants of Health Significantly Affecting Care: Lives at home alone    Diagnostic testing considered: MRI of the brain    Kalpana Haas D.O.  Emergency Medicine          Procedure  Procedures     Kalpana Haas,   11/09/23 0424

## 2023-11-10 ENCOUNTER — APPOINTMENT (OUTPATIENT)
Dept: CARDIOLOGY | Facility: HOSPITAL | Age: 88
DRG: 066 | End: 2023-11-10
Payer: MEDICARE

## 2023-11-10 LAB
AORTIC VALVE MEAN GRADIENT: 1
AORTIC VALVE PEAK VELOCITY: 0.82
AV PEAK GRADIENT: 2.7
AVA (PEAK VEL): 3.07
AVA (VTI): 3.13
CHOLEST SERPL-MCNC: 162 MG/DL (ref 133–200)
CHOLEST/HDLC SERPL: 2.9 {RATIO}
EJECTION FRACTION APICAL 4 CHAMBER: 53.5
EJECTION FRACTION: 59
EST. AVERAGE GLUCOSE BLD GHB EST-MCNC: 108 MG/DL
HBA1C MFR BLD: 5.4 %
HDLC SERPL-MCNC: 55 MG/DL
LDLC SERPL CALC-MCNC: 88 MG/DL (ref 65–130)
LEFT ATRIUM VOLUME AREA LENGTH INDEX BSA: 91.8
LEFT VENTRICLE INTERNAL DIMENSION DIASTOLE: 5.47 (ref 3.5–6)
LEFT VENTRICULAR OUTFLOW TRACT DIAMETER: 2.1
RIGHT VENTRICLE FREE WALL PEAK S': 10.1
RIGHT VENTRICLE PEAK SYSTOLIC PRESSURE: 37.1
TRICUSPID ANNULAR PLANE SYSTOLIC EXCURSION: 1.8
TRIGL SERPL-MCNC: 95 MG/DL (ref 40–150)
TSH SERPL DL<=0.05 MIU/L-ACNC: 1.21 MIU/L (ref 0.27–4.2)
VIT B12 SERPL-MCNC: 495 PG/ML (ref 211–946)

## 2023-11-10 PROCEDURE — 2500000001 HC RX 250 WO HCPCS SELF ADMINISTERED DRUGS (ALT 637 FOR MEDICARE OP): Performed by: INTERNAL MEDICINE

## 2023-11-10 PROCEDURE — 83036 HEMOGLOBIN GLYCOSYLATED A1C: CPT | Performed by: PSYCHIATRY & NEUROLOGY

## 2023-11-10 PROCEDURE — 99232 SBSQ HOSP IP/OBS MODERATE 35: CPT

## 2023-11-10 PROCEDURE — 93306 TTE W/DOPPLER COMPLETE: CPT | Performed by: INTERNAL MEDICINE

## 2023-11-10 PROCEDURE — 84443 ASSAY THYROID STIM HORMONE: CPT | Performed by: PSYCHIATRY & NEUROLOGY

## 2023-11-10 PROCEDURE — 99233 SBSQ HOSP IP/OBS HIGH 50: CPT | Performed by: INTERNAL MEDICINE

## 2023-11-10 PROCEDURE — 2500000004 HC RX 250 GENERAL PHARMACY W/ HCPCS (ALT 636 FOR OP/ED): Performed by: INTERNAL MEDICINE

## 2023-11-10 PROCEDURE — 36415 COLL VENOUS BLD VENIPUNCTURE: CPT | Performed by: PSYCHIATRY & NEUROLOGY

## 2023-11-10 PROCEDURE — 93306 TTE W/DOPPLER COMPLETE: CPT

## 2023-11-10 PROCEDURE — 80061 LIPID PANEL: CPT | Performed by: PSYCHIATRY & NEUROLOGY

## 2023-11-10 PROCEDURE — 82607 VITAMIN B-12: CPT | Performed by: PSYCHIATRY & NEUROLOGY

## 2023-11-10 PROCEDURE — 2060000001 HC INTERMEDIATE ICU ROOM DAILY

## 2023-11-10 RX ADMIN — APIXABAN 5 MG: 5 TABLET, FILM COATED ORAL at 21:46

## 2023-11-10 RX ADMIN — APIXABAN 5 MG: 5 TABLET, FILM COATED ORAL at 09:47

## 2023-11-10 RX ADMIN — DOCUSATE SODIUM 100 MG: 100 CAPSULE, LIQUID FILLED ORAL at 09:48

## 2023-11-10 RX ADMIN — PANTOPRAZOLE SODIUM 40 MG: 40 TABLET, DELAYED RELEASE ORAL at 09:48

## 2023-11-10 RX ADMIN — METOPROLOL TARTRATE 12.5 MG: 25 TABLET, FILM COATED ORAL at 21:47

## 2023-11-10 RX ADMIN — ICOSAPENT ETHYL 1 G: 1 CAPSULE ORAL at 13:27

## 2023-11-10 RX ADMIN — METOPROLOL TARTRATE 12.5 MG: 25 TABLET, FILM COATED ORAL at 09:49

## 2023-11-10 ASSESSMENT — COGNITIVE AND FUNCTIONAL STATUS - GENERAL
DAILY ACTIVITIY SCORE: 24
MOBILITY SCORE: 24
DAILY ACTIVITIY SCORE: 24
MOBILITY SCORE: 24

## 2023-11-10 ASSESSMENT — PAIN - FUNCTIONAL ASSESSMENT
PAIN_FUNCTIONAL_ASSESSMENT: 0-10

## 2023-11-10 ASSESSMENT — PAIN SCALES - GENERAL
PAINLEVEL_OUTOF10: 0 - NO PAIN

## 2023-11-10 ASSESSMENT — ACTIVITIES OF DAILY LIVING (ADL): LACK_OF_TRANSPORTATION: NO

## 2023-11-10 NOTE — PROGRESS NOTES
"Alvin Whitmore is a 89 y.o. male on day 1 of admission presenting with Acute CVA (cerebrovascular accident) (CMS/[facility]).    Subjective   Patient is feeling better had episode of seeing wavy lines.  Left arm strength normal       Objective     Physical Exam  Vitals reviewed.   Constitutional:       Appearance: Normal appearance.   HENT:      Head: Normocephalic and atraumatic.      Right Ear: Tympanic membrane, ear canal and external ear normal.      Left Ear: Tympanic membrane, ear canal and external ear normal.      Nose: Nose normal.      Mouth/Throat:      Pharynx: Oropharynx is clear.   Eyes:      Extraocular Movements: Extraocular movements intact.      Conjunctiva/sclera: Conjunctivae normal.      Pupils: Pupils are equal, round, and reactive to light.   Cardiovascular:      Rate and Rhythm: Normal rate and regular rhythm.      Pulses: Normal pulses.      Heart sounds: Normal heart sounds.   Pulmonary:      Effort: Pulmonary effort is normal.      Breath sounds: Normal breath sounds.   Abdominal:      General: Abdomen is flat. Bowel sounds are normal.      Palpations: Abdomen is soft.   Musculoskeletal:      Cervical back: Normal range of motion and neck supple.   Skin:     General: Skin is warm and dry.   Neurological:      General: No focal deficit present.      Mental Status: He is alert and oriented to person, place, and time.   Psychiatric:         Mood and Affect: Mood normal.         Last Recorded Vitals  Blood pressure 141/53, pulse 55, temperature 35.9 °C (96.6 °F), temperature source Temporal, resp. rate 19, height 1.803 m (5' 10.98\"), weight 78.1 kg (172 lb 2.9 oz), SpO2 100 %.  Intake/Output last 3 Shifts:  No intake/output data recorded.    Relevant Results                Scheduled medications  apixaban, 5 mg, oral, BID  docusate sodium, 100 mg, oral, BID  icosapent ethyL, 1 capsule, oral, Daily  metoprolol tartrate, 12.5 mg, oral, BID  pantoprazole, 40 mg, oral, Daily   Or  pantoprazole, 40 mg, " intravenous, Daily  sulfur hexafluoride microsphr, 2 mL, intravenous, Once      Continuous medications     PRN medications  PRN medications: acetaminophen  Results for orders placed or performed during the hospital encounter of 11/09/23 (from the past 24 hour(s))   Transthoracic Echo (TTE) Complete   Result Value Ref Range    AV pk julissa 0.82     LVOT diam 2.10     AV mn grad 1.0     Tricuspid annular plane systolic excursion 1.8     LV biplane EF 59     LA vol index A/L 91.8     RV free wall pk S' 10.10     RVSP 37.1     LVIDd 5.47     AV pk grad 2.7     Aortic Valve Area by Continuity of VTI 3.13     Aortic Valve Area by Continuity of Peak Velocity 3.07     LV A4C EF 53.5      Carotid duplex bilateral    Result Date: 11/10/2023           [place] [address]            Phone [phone]  Vascular Lab Report  VASC US CAROTID ARTERY DUPLEX BILATERAL Patient Name:      MARGE Barreto Physician:  [id number] Janine Zarate MD Study Date:        11/9/2023           Ordering Provider:  [id number] SHANNAN BOSTON MRN/PID:           [id number]            Fellow: Accession#:        [id number]        Technologist:       Natalya Barragan RVT Date of Birth/Age: 11/13/1931 / 89     Technologist 2:                    years Gender:            M                   Encounter#:         [id number] Admission Status:  Inpatient           Location Performed: [facility]  Diagnosis/ICD: Cerebral infarction, unspecified-I63.9 CPT Codes:     62157 Cerebrovascular Carotid Duplex scan complete  CONCLUSIONS: Right Carotid: Findings are consistent with less than 50% stenosis of the right proximal internal carotid artery. Laminar flow seen by color Doppler. Right external carotid artery appears patent with no evidence of stenosis. The right vertebral artery is patent with antegrade flow. No evidence of hemodynamically significant  stenosis in the right subclavian artery. Left Carotid: Findings are consistent with less than 50% stenosis of the left proximal internal carotid artery. Left external carotid artery appears patent with no evidence of stenosis. The left vertebral artery is patent with antegrade flow. No evidence of hemodynamically significant stenosis in the left subclavian artery. Additional Findings: Irregular cardiac rhythm noted.  Comparison: Compared with study from 12/22/2017, no significant change.  Imaging & Doppler Findings: Right Plaque Morph: The distal right common carotid artery demonstrates heterogenous plaque.   Right                      Left   PSV     EDV                PSV     EDV 67 cm/s           CCA P    82 cm/s 61 cm/s           CCA D    60 cm/s 53 cm/s 6 cm/s    ICA P    48 cm/s 8 cm/s 80 cm/s 9 cm/s    ICA M    67 cm/s 14 cm/s 73 cm/s 13 cm/s   ICA D    86 cm/s 19 cm/s 59 cm/s            ECA     37 cm/s 66 cm/s 10 cm/s Vertebral  45 cm/s 6 cm/s 63 cm/s         Subclavian 67 cm/s                Right Left ICA/CCA Ratio  0.9  0.8   [id number] Janine Zarate MD Electronically signed by [id number] Janine Zarate MD on 11/10/2023 at 2:57:44 PM  ** Final **     Transthoracic Echo (TTE) Complete    Result Date: 11/10/2023           [place] [address]            Phone [phone] TRANSTHORACIC ECHOCARDIOGRAM REPORT  Patient Name:     MARGE Barreto Physician:   [id number] Chris Myers DO Study Date:       11/10/2023           Ordering Provider:   [id number] VIC MILLARD MRN/PID:          [id number]             Fellow: Accession#:       [id number]         Nurse: Date of           11/13/1931 / 89      Sonographer:         Wilfredo Hughes RDCS Birth/Age:        years Gender:           M                    Additional Staff: Height:           180.00 cm            Admit Date: Weight:           78.00 kg             Admission  Status:    Inpatient - Routine BSA:              1.98 m2              Department Location: [facility] Blood Pressure: 138 /78 mmHg Study Type:    TRANSTHORACIC ECHO (TTE) COMPLETE Diagnosis/ICD: Other transient cerebral ischemic attacks and related                syndromes-G45.8 Indication:    Cerebrovascular Accident CPT Codes:     Echo Complete w Full Doppler-[id number] Patient History: Pertinent         HTN, Hyperlipidemia, CVA, Murmur and LE Edema. Abn Ekg, History:          Weakness. Study Detail: The following Echo studies were performed: 2D, M-Mode, Doppler and               color flow.  PHYSICIAN INTERPRETATION: Left Ventricle: Left ventricular systolic function is normal, with an estimated ejection fraction of 55-60%. There are no regional wall motion abnormalities. The left ventricular cavity size is normal. Left ventricular diastolic filling was indeterminate. Left Atrium: The left atrium is moderately dilated. Right Ventricle: The right ventricle is normal in size. There is normal right ventricular global systolic function. Right Atrium: The right atrium is moderately dilated. Aortic Valve: The aortic valve appears structurally normal. There is mild aortic valve regurgitation. The peak instantaneous gradient of the aortic valve is 2.7 mmHg. The mean gradient of the aortic valve is 1.0 mmHg. Mitral Valve: The mitral valve is normal in structure. There is moderate mitral valve regurgitation which is eccentrically directed. Tricuspid Valve: The tricuspid valve is structurally normal. There is mild tricuspid regurgitation. The Doppler estimated RVSP is mildly elevated at 37.1 mmHg. Pulmonic Valve: The pulmonic valve is structurally normal. There is mild pulmonic valve regurgitation. Pericardium: There is no pericardial effusion noted. Aorta: The aortic root is normal.  CONCLUSIONS:  1. Left ventricular systolic function is normal with a 55-60% estimated ejection fraction.  2. The left atrium is moderately  dilated.  3. The right atrium is moderately dilated.  4. Moderate mitral valve regurgitation.  5. Mildly elevated RVSP.  6. Mild aortic valve regurgitation. QUANTITATIVE DATA SUMMARY: 2D MEASUREMENTS:                          Normal Ranges: LAs:           5.30 cm   (2.7-4.0cm) IVSd:          0.87 cm   (0.6-1.1cm) LVPWd:         0.87 cm   (0.6-1.1cm) LVIDd:         5.47 cm   (3.9-5.9cm) LVIDs:         3.44 cm LV Mass Index: 89.4 g/m2 LV % FS        37.1 % LA VOLUME:                               Normal Ranges: LA Vol A4C:        190.5 ml   (22+/-6mL/m2) LA Vol A2C:        165.8 ml LA Vol BP:         181.2 ml LA Vol Index A4C:  96.5ml/m2 LA Vol Index A2C:  83.9 ml/m2 LA Vol Index BP:   91.8 ml/m2 LA Area A4C:       41.0 cm2 LA Area A2C:       39.0 cm2 LA Major Axis A4C: 7.5 cm LA Major Axis A2C: 7.8 cm LA Volume Index:   83.3 ml/m2 LA Vol A4C:        170.0 ml LA Vol A2C:        153.0 ml RA VOLUME BY A/L METHOD:                       Normal Ranges: RA Area A4C: 37.0 cm2 M-MODE MEASUREMENTS:                  Normal Ranges: Ao Root: 3.20 cm (2.0-3.7cm) LAs:     5.70 cm (2.7-4.0cm) AORTA MEASUREMENTS:                    Normal Ranges: Asc Ao, d: 3.70 cm (2.1-3.4cm) LV SYSTOLIC FUNCTION BY 2D PLANIMETRY (MOD):                     Normal Ranges: EF-A4C View: 53.5 % (>=55%) EF-A2C View: 64.6 % EF-Biplane:  58.7 % LV DIASTOLIC FUNCTION:                     Normal Ranges: MV Peak E: 0.91 m/s (0.7-1.2 m/s) MITRAL VALVE:                 Normal Ranges: MV DT: 146 msec (150-240msec) MITRAL INSUFFICIENCY:                           Normal Ranges: PISA Radius:  0.8 cm MR VTI:       220.00 cm MR Vmax:      573.00 cm/s MR Alias Alex: 38.5 cm/s MR Volume:    59.44 ml MR Flow Rt:   154.82 ml/s MR EROA:      0.27 cm2 AORTIC VALVE:                                   Normal Ranges: AoV Vmax:                0.82 m/s (<=1.7m/s) AoV Peak P.7 mmHg (<20mmHg) AoV Mean P.0 mmHg (1.7-11.5mmHg) LVOT Max Alex:             0.72 m/s (<=1.1m/s) AoV VTI:                 17.50 cm (18-25cm) LVOT VTI:                15.80 cm LVOT Diameter:           2.10 cm  (1.8-2.4cm) AoV Area, VTI:           3.13 cm2 (2.5-5.5cm2) AoV Area,Vmax:           3.07 cm2 (2.5-4.5cm2) AoV Dimensionless Index: 0.90  RIGHT VENTRICLE: RV Basal 4.84 cm RV Mid   3.07 cm RV Major 5.9 cm TAPSE:   18.4 mm RV s'    0.10 m/s TRICUSPID VALVE/RVSP:                             Normal Ranges: Peak TR Velocity: 2.92 m/s RV Syst Pressure: 37.1 mmHg (< 30mmHg) PULMONIC VALVE:                      Normal Ranges: PV Max Alex: 0.6 m/s  (0.6-0.9m/s) PV Max P.6 mmHg  [id number] Chris Myers DO Electronically signed on 11/10/2023 at 12:24:40 PM  ** Final **     MR brain wo IV contrast    Result Date: 2023  Interpreted By:  Delvin Rosas, STUDY: MR BRAIN WO IV CONTRAST; 2023 5:34 pm   INDICATION: Signs/Symptoms:Left arm weakness;   COMPARISON: CT head dated 2023   ACCESSION NUMBER(S): [id number]   ORDERING CLINICIAN: SHANNAN BOSTON   TECHNIQUE: Multiple, multiplanar sequences of the brain were acquired.   FINDINGS: No focal mass effect or midline shift is identified. The ventricles and sulci are symmetric and appropriate for the patient's age.   The gray-white matter differentiation is preserved. There is a small focus diffusion restriction involving the right operculum and extending into the right external capsule. There is associated increased T2/FLAIR signal in this region. Patchy areas of increased T2 and FLAIR signal are seen in the subcortical and periventricular white matter; compatible with small vessel ischemic disease.   No acute intracranial hemorrhage is seen. No intra-axial or extra-axial fluid collection is seen.   The visualized paranasal sinuses and mastoid air cells are clear.       1. Small focus of acute to subacute stroke involving the right operculum extending into the right external capsule. 2. Chronic small vessel ischemic disease.   MACRO: Delvin  Ralph discussed the significance and urgency of this critical finding by telephone with  SHANNAN BOSTON on 11/9/2023 at 5:46 pm.  (**-RCF-**) Findings:  See findings.   Signed by: Delvin Rosas 11/9/2023 5:46 PM Dictation workstation:   [id number]    CT brain attack head wo IV contrast    Result Date: 11/9/2023  Interpreted By:  Winston Latif, STUDY: CT BRAIN ATTACK HEAD WO IV CONTRAST; 11/9/2023 3:33 am   INDICATION: Signs/Symptoms:Stroke Evaluation; /left-sided weakness and slurred speech/history of TIA/brain attack   COMPARISON: 12/20/2017   ACCESSION NUMBER(S): [id number]   ORDERING CLINICIAN: RICHARD MORROW   TECHNIQUE: Contiguous axial images were acquired from the vertex through the posterior fossa without IV contrast.   All CT examinations are performed with one or more of the following dose reduction techniques: Automated Exposure Control, adjustment of mA and/or kV according to patient size, or use of iterative reconstruction techniques.   FINDINGS: No focal mass effect or midline shift is identified. The ventricles and sulci are symmetric and appropriate for the patient's age. However, chronic involutional change of the cerebral hemispheres is noted.   Encephalomalacia is seen the posterior right parietal region likely from the sequela of remote infarct. Nonspecific hypodensities are identified within the periventricular and subcortical white matter likely due to chronic postischemic white matter disease. Atherosclerotic calcifications are seen within the carotid siphons and vertebral arteries.   No acute intracranial hemorrhage is identified. No intra-axial or extra-axial fluid collection is seen.   The visualized paranasal sinuses and mastoid air cells are clear.       1. No acute intracranial findings. 2. Remote right posterior parietal lobe infarct.   . .   This report is in agreement with the preliminary report issued by [facility].   MACRO: None   Signed by: Winston Latif  11/9/2023 8:12 AM Dictation workstation:   [id number]    CT angio head neck w and wo IV contrast    Result Date: 11/9/2023  Interpreted By:  Maddy Owens, STUDY: CT ANGIO HEAD NECK W AND WO IV CONTRAST;  11/9/2023 3:34 am   INDICATION: Signs/Symptoms:Stroke Evaluation with VAN Positive.   COMPARISON: Head CT dated same day.   ACCESSION NUMBER(S): [id number]   ORDERING CLINICIAN: RICHARD MORROW   TECHNIQUE: Unenhanced CT images of the head were obtained. Subsequently,   was administered intravenously and axial images of the head and neck were acquired.  Coronal, sagittal, and 3-D reconstructions were provided for review.   FINDINGS:     CTA HEAD FINDINGS:   No aneurysms, stenosis or occlusion of the proximal intracranial vasculature.   CTA NECK FINDINGS:   No carotid or vertebral stenosis.   Soft tissues of the neck do not demonstrate any suspicious findings. There are diffuse ground-glass opacities involving the lung apices.   Paranasal sinuses and bilateral mastoid air cells are clear.       No aneurysms, stenosis or occlusion of the proximal intracranial vasculature. No carotid or vertebral stenosis.   MACRO: None   Signed by: Maddy Owens 11/9/2023 8:12 AM Dictation workstation:   [id number]                Assessment/Plan   Principal Problem:    Acute CVA (cerebrovascular accident) (CMS/HCC)  Active Problems:    Atrial fibrillation (CMS/HCC)    Essential hypertension    MRI results confirmed acute stroke  Stroke most likely from noncompliance with Eliquis  Patient's heart rate is controlled  He is on Eliquis  Blood pressure is stable  Echo results pending  Cardiology and neurology input noted  We will start PT OT and social service consult for discharge planning       I spent  minutes in the professional and overall care of this patient.      Kati Langley MD

## 2023-11-10 NOTE — PROGRESS NOTES
"Alvin Whitmore is a 89 y.o. male on day 1 of admission presenting with Acute CVA (cerebrovascular accident) (CMS/HCC).    Subjective   Patient up in chair. No new complaints overnight.        Objective     Physical Exam  General: alert, oriented x3, pleasant   Heart: S1/S2, Rate 60, Rhythm irregular, no s3 or s4, no murmur, thrill, or heaves at PMI.   Lungs: Clear, equal expansion and excursion, no wheezes, crackles, rhales or rhonci.  No conversational dyspnea noted.  Abdomen: bowel sounds x 4, soft, non-tender  Genitourinary: deferred   Extremities: No edema to the extremities      Last Recorded Vitals  Blood pressure 138/78, pulse 54, temperature 36.4 °C (97.5 °F), temperature source Tympanic, resp. rate 18, height 1.803 m (5' 10.98\"), weight 78.1 kg (172 lb 2.9 oz), SpO2 100 %.  Intake/Output last 3 Shifts:  No intake/output data recorded.    Relevant Results  No results found for this or any previous visit (from the past 24 hour(s)).        Assessment/Plan   Principal Problem:    Acute CVA (cerebrovascular accident) (CMS/HCC)  Active Problems:    Atrial fibrillation (CMS/HCC)    Essential hypertension    11/9: Patient presented to the emergency department with left arm numbness and weakness.  Patient denies any chest pain, palpitations or pressure.  He denies any shortness of breath.  On arrival to the emergency department CT of the head was completed which was negative for any acute intracranial processes.  Lab work showed a sodium of 136, potassium of 4.3, creatinine of 0.80 and hemoglobin of 11.4.  His troponin was slightly elevated at 24.  EKG completed in the emergency department showed a slow atrial fibrillation with a ventricular rate of 59 and no acute T wave or ST changes.  Patient's blood pressure was slightly elevated on arrival to the emergency department at 150/74.  His most recent recorded blood pressure was normal at 119/74.  Patient is breathing comfortably on room air with pulse oximetry of " 100%.  He has no edema to the extremities.  Patient takes Eliquis for stroke risk reduction with his atrial fibrillation however he does state that recently he may have missed a few doses of his Eliquis.  At this time we are waiting on the results of an MRI brain, carotid ultrasound and echocardiogram.  Would continue on patient's current regimen of metoprolol for rate and blood pressure control as well as Eliquis for stroke risk reduction.  We will follow with you.     11/10: No acute events overnight. Blood pressure normotensive with his last recorded at 138/78.  Patient remains in atrial fibrillation on telemetry with rates in the 50s and 60s. Patient is breathing comfortably on room air with pulse oximetry of 100%.  No significant edema noted on examination.  No lab results are yet available for this morning.  Carotid ultrasound completed yesterday showed less than 50% stenosis on the right and left side.  MRI brain showed an acute to subacute stroke involving the right operculum and extending into the right external capsule as well as chronic small vessel disease.  Echocardiogram to be completed this morning we will await those results. We will follow with you.          Tamra Olivarez, APRN-CNP

## 2023-11-10 NOTE — CARE PLAN
Problem: General Stroke  Goal: Demonstrate improvement in neurological exam throughout the shift  Outcome: Progressing  Goal: Maintain BP within ordered limits throughout shift  Outcome: Progressing  Goal: Participate in treatment (ie., meds, therapy) throughout shift  Outcome: Progressing  Goal: No symptoms of aspiration throughout shift  Outcome: Progressing  Goal: No symptoms of hemorrhage throughout shift  Outcome: Progressing  Goal: Tolerate enteral feeding throughout shift  Outcome: Progressing  Goal: Decreased nausea/vomiting throughout shift  Outcome: Progressing   The patient's goals for the shift include      The clinical goals for the shift include pt remain safe    Over the shift, the patient did make progress toward the following goals

## 2023-11-10 NOTE — PROGRESS NOTES
"Neurology Progress Note     Subjective   89 y.o. male been eval for stroke.  Pat cont. Clinically stable w/o further recurrence of sympt's and only remains w/ residual chronic left hand numbness.  On further questioning, mentions that he forgot to take his eliquis this past Wednesday and one day last week.    Objective     Last Recorded Vitals  Blood pressure 141/53, pulse 55, temperature 35.9 °C (96.6 °F), temperature source Temporal, resp. rate 19, height 1.803 m (5' 10.98\"), weight 78.1 kg (172 lb 2.9 oz), SpO2 100 %.    Physical Exam  Neurological Exam  General:  lying in bed, alone, cooperative to the interview and physical exam, no distress, good historian for age.    Mental Status:  Alert, )x3; follow simple and complex commands, no right to left confusion, normal speech fluency and comprehension, normal naming and repetition, normal fund of knowledge.     Cranial Nerves II-XII, Motor, Coordination, Reflexes, Sensory: unchanged from previous eval.      Relevant Results  Results for orders placed or performed during the hospital encounter of 11/09/23 (from the past 96 hour(s))   CBC and Auto Differential   Result Value Ref Range    WBC 5.7 4.4 - 11.3 x10*3/uL    nRBC 0.0 0.0 - 0.0 /100 WBCs    RBC 3.71 (L) 4.50 - 5.90 x10*6/uL    Hemoglobin 11.4 (L) 13.5 - 17.5 g/dL    Hematocrit 35.8 (L) 41.0 - 52.0 %    MCV 97 80 - 100 fL    MCH 30.7 26.0 - 34.0 pg    MCHC 31.8 (L) 32.0 - 36.0 g/dL    RDW 13.4 11.5 - 14.5 %    Platelets 188 150 - 450 x10*3/uL    Neutrophils % 47.5 40.0 - 80.0 %    Immature Granulocytes %, Automated 0.2 0.0 - 0.9 %    Lymphocytes % 36.4 13.0 - 44.0 %    Monocytes % 11.2 2.0 - 10.0 %    Eosinophils % 3.5 0.0 - 6.0 %    Basophils % 1.2 0.0 - 2.0 %    Neutrophils Absolute 2.70 1.60 - 5.50 x10*3/uL    Immature Granulocytes Absolute, Automated 0.01 0.00 - 0.50 x10*3/uL    Lymphocytes Absolute 2.07 0.80 - 3.00 x10*3/uL    Monocytes Absolute 0.64 0.05 - 0.80 x10*3/uL    Eosinophils Absolute 0.20 " 0.00 - 0.40 x10*3/uL    Basophils Absolute 0.07 0.00 - 0.10 x10*3/uL   Comprehensive metabolic panel   Result Value Ref Range    Glucose 95 65 - 99 mg/dL    Sodium 136 133 - 145 mmol/L    Potassium 4.3 3.4 - 5.1 mmol/L    Chloride 102 97 - 107 mmol/L    Bicarbonate 26 24 - 31 mmol/L    Urea Nitrogen 28 (H) 8 - 25 mg/dL    Creatinine 0.80 0.40 - 1.60 mg/dL    eGFR 84 >60 mL/min/1.73m*2    Calcium 8.6 8.5 - 10.4 mg/dL    Albumin 3.4 (L) 3.5 - 5.0 g/dL    Alkaline Phosphatase 49 35 - 125 U/L    Total Protein 6.5 5.9 - 7.9 g/dL    AST 23 5 - 40 U/L    Bilirubin, Total 0.6 0.1 - 1.2 mg/dL    ALT 10 5 - 40 U/L    Anion Gap 8 <=19 mmol/L   Troponin T, High Sensitivity   Result Value Ref Range    Troponin T, High Sensitivity 24 (H) <=15 ng/L   POCT GLUCOSE   Result Value Ref Range    POCT Glucose 75 74 - 99 mg/dL   POCT glucose   Result Value Ref Range    POCT Glucose 75 74 - 99 mg/dL   Protime-INR   Result Value Ref Range    Protime 12.4 9.3 - 12.7 seconds    INR 1.2 0.9 - 1.2   APTT   Result Value Ref Range    aPTT 27.5 22.0 - 32.5 seconds   Transthoracic Echo (TTE) Complete   Result Value Ref Range    AV pk julissa 0.82     LVOT diam 2.10     AV mn grad 1.0     Tricuspid annular plane systolic excursion 1.8     LV biplane EF 59     LA vol index A/L 91.8     RV free wall pk S' 10.10     RVSP 37.1     LVIDd 5.47     AV pk grad 2.7     Aortic Valve Area by Continuity of VTI 3.13     Aortic Valve Area by Continuity of Peak Velocity 3.07     LV A4C EF 53.5      MRI brain w/ and w/o cont (11/09/23): IMPRESSION:  1. Small focus of acute to subacute stroke involving the right  operculum extending into the right external capsule.  2. Chronic small vessel ischemic disease.    CT brain attack head wo IV contrast  Result Date: 11/9/2023  1. No acute intracranial findings. 2. Remote right posterior parietal lobe infarct.       CT angio head neck w and wo IV contrast  Result Date: 11/9/2023  No aneurysms, stenosis or occlusion of the  proximal intracranial vasculature. No carotid or vertebral stenosis.     Carotid duplex bilateral  Result Date: 11/9/2023  PRELIMINARY CONCLUSIONS:  Right Carotid: Findings are consistent with less than 50% stenosis of the right proximal internal carotid artery. Laminar flow seen by color Doppler. Right external carotid artery appears patent with no evidence of stenosis. The right vertebral artery is patent with antegrade flow. No evidence of hemodynamically significant stenosis in the right subclavian artery. Left Carotid: Findings are consistent with less than 50% stenosis of the left proximal internal carotid artery. Left external carotid artery appears patent with no evidence of stenosis. The left vertebral artery is patent with antegrade flow. No evidence of hemodynamically significant stenosis in the left subclavian artery.  Imaging & Doppler Findings: Right Plaque Morph: The distal right common carotid artery demonstrates heterogenous plaque.       Echo (11/09/23): CONCLUSIONS:   1. Left ventricular systolic function is normal with a 55-60% estimated ejection fraction.   2. The left atrium is moderately dilated.   3. The right atrium is moderately dilated.   4. Moderate mitral valve regurgitation.   5. Mildly elevated RVSP.   6. Mild aortic valve regurgitation.    Assessment/Plan      Principal Problem:    Acute CVA (cerebrovascular accident) (CMS/HCC)  Active Problems:    Atrial fibrillation (CMS/HCC)    Essential hypertension    Impression:  Stroke (Rt operculum extending into the right external capsule) w/ mechanism most likely related to his A.fib and missing Eliquis dose.  Cardio on board.  Need to cont. Control of vascular risk factors + eliquis.  No further testing planned from Neuro standpoint.       Recommendations/Plan:  Check MRI brain w/o cont.   Check b12, tsh, lipids, hgba1c; tx if needed (as per primary medical team).    3.   Follow Cardio recc's.   4.   Control of all vascular risk factors,  statin and eliquis.   5.   Avoid hypotension.    6.   No further Neuro recc's; follow can cont w/ Neuro as outpat; sign off.      James Rolon MD

## 2023-11-10 NOTE — PROGRESS NOTES
Alvin Whitmore is a 89 y.o. male on day 1 of admission presenting with Acute CVA (cerebrovascular accident) (CMS/[facility]).    TCC met with patient at bedside. Introduced self and explained role. Patient lives in a home with alone. Patient independent with ADLs. There are no steps to enter and 12 steps within the home. Patient has all medical equipment, grab bars, shower chair can and walker, but doesn't us the cane or walker. No reports of smoking or alcohol use.  PCP [facility] Adam Sharma. [facility] Patient states he ahs LW and POA son Jewel is listed as POA. Patient states he doesn't have his phone and has no contact info for hi sfriend or son. States is friend would be the one to transport him but he can't get in contact with him. Patient had TCC look up [employer] phone number pt called and left a message. [facility] friends name is Sony Hickman and he may have a website with contact info. TCC looked on [employer] for any contact info. No luck at this time.         Bernice Levine RN

## 2023-11-10 NOTE — CARE PLAN
"The patient's goals for the shift include \" I want to go home I have things to take care of\"  Problem: General Stroke  Goal: Demonstrate improvement in neurological exam throughout the shift  Outcome: Progressing  Goal: Maintain BP within ordered limits throughout shift  Outcome: Progressing  Goal: Participate in treatment (ie., meds, therapy) throughout shift  Outcome: Progressing  Goal: No symptoms of aspiration throughout shift  Outcome: Progressing  Goal: No symptoms of hemorrhage throughout shift  Outcome: Progressing  Goal: Decreased nausea/vomiting throughout shift  Outcome: Progressing  Goal: Controlled blood glucose throughout shift  Outcome: Progressing  Goal: Out of bed three times today  Outcome: Progressing     Problem: ICU Stroke  Goal: Maintain patent airway throughout shift  Outcome: Progressing  Goal: Achieve/maintain targeted sodium level throughout shift  Outcome: Progressing     Problem: Skin  Goal: Participates in plan/prevention/treatment measures  Outcome: Progressing  Goal: Prevent/manage excess moisture  Outcome: Progressing  Goal: Prevent/minimize sheer/friction injuries  Outcome: Progressing       The clinical goals for the shift include maintain safety, decrease anxiety        "

## 2023-11-11 PROCEDURE — 2500000004 HC RX 250 GENERAL PHARMACY W/ HCPCS (ALT 636 FOR OP/ED): Performed by: INTERNAL MEDICINE

## 2023-11-11 PROCEDURE — 97165 OT EVAL LOW COMPLEX 30 MIN: CPT | Mod: GO

## 2023-11-11 PROCEDURE — 2060000001 HC INTERMEDIATE ICU ROOM DAILY

## 2023-11-11 PROCEDURE — 99231 SBSQ HOSP IP/OBS SF/LOW 25: CPT | Performed by: INTERNAL MEDICINE

## 2023-11-11 PROCEDURE — 97161 PT EVAL LOW COMPLEX 20 MIN: CPT | Mod: GP

## 2023-11-11 PROCEDURE — 2500000001 HC RX 250 WO HCPCS SELF ADMINISTERED DRUGS (ALT 637 FOR MEDICARE OP): Performed by: INTERNAL MEDICINE

## 2023-11-11 RX ADMIN — PANTOPRAZOLE SODIUM 40 MG: 40 TABLET, DELAYED RELEASE ORAL at 08:37

## 2023-11-11 RX ADMIN — DOCUSATE SODIUM 100 MG: 100 CAPSULE, LIQUID FILLED ORAL at 08:37

## 2023-11-11 RX ADMIN — METOPROLOL TARTRATE 12.5 MG: 25 TABLET, FILM COATED ORAL at 08:37

## 2023-11-11 RX ADMIN — APIXABAN 5 MG: 5 TABLET, FILM COATED ORAL at 21:28

## 2023-11-11 RX ADMIN — ICOSAPENT ETHYL 1 G: 1 CAPSULE ORAL at 08:37

## 2023-11-11 RX ADMIN — APIXABAN 5 MG: 5 TABLET, FILM COATED ORAL at 08:37

## 2023-11-11 SDOH — SOCIAL STABILITY: SOCIAL NETWORK
DO YOU BELONG TO ANY CLUBS OR ORGANIZATIONS SUCH AS CHURCH GROUPS UNIONS, FRATERNAL OR ATHLETIC GROUPS, OR SCHOOL GROUPS?: YES

## 2023-11-11 SDOH — SOCIAL STABILITY: SOCIAL INSECURITY: WITHIN THE LAST YEAR, HAVE YOU BEEN AFRAID OF YOUR PARTNER OR EX-PARTNER?: NO

## 2023-11-11 SDOH — SOCIAL STABILITY: SOCIAL INSECURITY
WITHIN THE LAST YEAR, HAVE YOU BEEN KICKED, HIT, SLAPPED, OR OTHERWISE PHYSICALLY HURT BY YOUR PARTNER OR EX-PARTNER?: NO

## 2023-11-11 SDOH — ECONOMIC STABILITY: TRANSPORTATION INSECURITY
IN THE PAST 12 MONTHS, HAS THE LACK OF TRANSPORTATION KEPT YOU FROM MEDICAL APPOINTMENTS OR FROM GETTING MEDICATIONS?: NO

## 2023-11-11 SDOH — ECONOMIC STABILITY: TRANSPORTATION INSECURITY
IN THE PAST 12 MONTHS, HAS LACK OF TRANSPORTATION KEPT YOU FROM MEETINGS, WORK, OR FROM GETTING THINGS NEEDED FOR DAILY LIVING?: NO

## 2023-11-11 SDOH — HEALTH STABILITY: MENTAL HEALTH: HOW OFTEN DO YOU HAVE 6 OR MORE DRINKS ON ONE OCCASION?: NEVER

## 2023-11-11 SDOH — SOCIAL STABILITY: SOCIAL INSECURITY
WITHIN THE LAST YEAR, HAVE TO BEEN RAPED OR FORCED TO HAVE ANY KIND OF SEXUAL ACTIVITY BY YOUR PARTNER OR EX-PARTNER?: NO

## 2023-11-11 SDOH — HEALTH STABILITY: MENTAL HEALTH
STRESS IS WHEN SOMEONE FEELS TENSE, NERVOUS, ANXIOUS, OR CAN'T SLEEP AT NIGHT BECAUSE THEIR MIND IS TROUBLED. HOW STRESSED ARE YOU?: NOT AT ALL

## 2023-11-11 SDOH — ECONOMIC STABILITY: INCOME INSECURITY: HOW HARD IS IT FOR YOU TO PAY FOR THE VERY BASICS LIKE FOOD, HOUSING, MEDICAL CARE, AND HEATING?: NOT HARD AT ALL

## 2023-11-11 SDOH — SOCIAL STABILITY: SOCIAL INSECURITY: WITHIN THE LAST YEAR, HAVE YOU BEEN HUMILIATED OR EMOTIONALLY ABUSED IN OTHER WAYS BY YOUR PARTNER OR EX-PARTNER?: NO

## 2023-11-11 SDOH — ECONOMIC STABILITY: FOOD INSECURITY: WITHIN THE PAST 12 MONTHS, THE FOOD YOU BOUGHT JUST DIDN'T LAST AND YOU DIDN'T HAVE MONEY TO GET MORE.: NEVER TRUE

## 2023-11-11 SDOH — ECONOMIC STABILITY: HOUSING INSECURITY: IN THE LAST 12 MONTHS, HOW MANY PLACES HAVE YOU LIVED?: 1

## 2023-11-11 SDOH — ECONOMIC STABILITY: INCOME INSECURITY: IN THE PAST 12 MONTHS, HAS THE ELECTRIC, GAS, OIL, OR WATER COMPANY THREATENED TO SHUT OFF SERVICE IN YOUR HOME?: NO

## 2023-11-11 SDOH — ECONOMIC STABILITY: FOOD INSECURITY: WITHIN THE PAST 12 MONTHS, YOU WORRIED THAT YOUR FOOD WOULD RUN OUT BEFORE YOU GOT MONEY TO BUY MORE.: NEVER TRUE

## 2023-11-11 SDOH — SOCIAL STABILITY: SOCIAL NETWORK: ARE YOU MARRIED, WIDOWED, DIVORCED, SEPARATED, NEVER MARRIED, OR LIVING WITH A PARTNER?: WIDOWED

## 2023-11-11 SDOH — ECONOMIC STABILITY: INCOME INSECURITY: IN THE LAST 12 MONTHS, WAS THERE A TIME WHEN YOU WERE NOT ABLE TO PAY THE MORTGAGE OR RENT ON TIME?: NO

## 2023-11-11 SDOH — SOCIAL STABILITY: SOCIAL NETWORK: HOW OFTEN DO YOU ATTEND CHURCH OR RELIGIOUS SERVICES?: NEVER

## 2023-11-11 SDOH — HEALTH STABILITY: PHYSICAL HEALTH: ON AVERAGE, HOW MANY MINUTES DO YOU ENGAGE IN EXERCISE AT THIS LEVEL?: 0 MIN

## 2023-11-11 SDOH — SOCIAL STABILITY: SOCIAL NETWORK: HOW OFTEN DO YOU GET TOGETHER WITH FRIENDS OR RELATIVES?: ONCE A WEEK

## 2023-11-11 SDOH — SOCIAL STABILITY: SOCIAL NETWORK
IN A TYPICAL WEEK, HOW MANY TIMES DO YOU TALK ON THE PHONE WITH FAMILY, FRIENDS, OR NEIGHBORS?: MORE THAN THREE TIMES A WEEK

## 2023-11-11 SDOH — HEALTH STABILITY: MENTAL HEALTH: HOW OFTEN DO YOU HAVE A DRINK CONTAINING ALCOHOL?: NEVER

## 2023-11-11 SDOH — HEALTH STABILITY: PHYSICAL HEALTH: ON AVERAGE, HOW MANY DAYS PER WEEK DO YOU ENGAGE IN MODERATE TO STRENUOUS EXERCISE (LIKE A BRISK WALK)?: 0 DAYS

## 2023-11-11 SDOH — HEALTH STABILITY: MENTAL HEALTH: HOW MANY STANDARD DRINKS CONTAINING ALCOHOL DO YOU HAVE ON A TYPICAL DAY?: PATIENT DOES NOT DRINK

## 2023-11-11 SDOH — SOCIAL STABILITY: SOCIAL NETWORK: HOW OFTEN DO YOU ATTENT MEETINGS OF THE CLUB OR ORGANIZATION YOU BELONG TO?: MORE THAN 4 TIMES PER YEAR

## 2023-11-11 ASSESSMENT — COGNITIVE AND FUNCTIONAL STATUS - GENERAL
MOBILITY SCORE: 24
DAILY ACTIVITIY SCORE: 24
MOBILITY SCORE: 24
DAILY ACTIVITIY SCORE: 24

## 2023-11-11 ASSESSMENT — PAIN - FUNCTIONAL ASSESSMENT
PAIN_FUNCTIONAL_ASSESSMENT: 0-10

## 2023-11-11 ASSESSMENT — PAIN SCALES - GENERAL
PAINLEVEL_OUTOF10: 0 - NO PAIN

## 2023-11-11 ASSESSMENT — LIFESTYLE VARIABLES
AUDIT-C TOTAL SCORE: 0
SKIP TO QUESTIONS 9-10: 1

## 2023-11-11 ASSESSMENT — ACTIVITIES OF DAILY LIVING (ADL)
BATHING_ASSISTANCE: INDEPENDENT
ADL_ASSISTANCE: INDEPENDENT
ADL_ASSISTANCE: INDEPENDENT

## 2023-11-11 NOTE — NURSING NOTE
"Patient states that he had a 60second period of time just prior to my entering the room. That he saw silvery wavy lines (vertical) in his right eye. The wavy lines did not obstruct his vision, he could see through them. He states he was calling room service and placing his dinner order, he did not have any slurred speech or anything that he felt was concerning.  Neuro status completely intact, no facial droop, no drift, no weakness noted and patient denies and further visual disturbance. He does state that this has happened \"many\" times in the past  "

## 2023-11-11 NOTE — CARE PLAN
Problem: General Stroke  Goal: Demonstrate improvement in neurological exam throughout the shift  Outcome: Progressing  Goal: Maintain BP within ordered limits throughout shift  Outcome: Progressing  Goal: Participate in treatment (ie., meds, therapy) throughout shift  Outcome: Progressing  Goal: No symptoms of aspiration throughout shift  Outcome: Progressing  Goal: No symptoms of hemorrhage throughout shift  Outcome: Progressing  Goal: Decreased nausea/vomiting throughout shift  Outcome: Progressing  Goal: Controlled blood glucose throughout shift  Outcome: Progressing  Goal: Out of bed three times today  Outcome: Progressing     Problem: ICU Stroke  Goal: Maintain patent airway throughout shift  Outcome: Progressing  Goal: Achieve/maintain targeted sodium level throughout shift  Outcome: Progressing     Problem: Skin  Goal: Participates in plan/prevention/treatment measures  Outcome: Progressing  Goal: Prevent/manage excess moisture  Outcome: Progressing  Goal: Prevent/minimize sheer/friction injuries  Outcome: Progressing   The patient's goals for the shift include      The clinical goals for the shift include maintain safety, decrease anxiety    Over the shift, the patient did not make progress toward the following goals. Barriers to progression include . Recommendations to address these barriers include .

## 2023-11-11 NOTE — NURSING NOTE
Assisted to bed, Denies any pain or discomfort. Needs anticipated. Call light in reach. Will continue to monitor.

## 2023-11-11 NOTE — PROGRESS NOTES
"Alvin Whitmore is a 89 y.o. male on day 2 of admission presenting with Acute CVA (cerebrovascular accident) (CMS/[facility]).    Subjective   Patient is little forgetful.  He has no way to call his family friends and ride home     Objective     Physical Exam  Vitals reviewed.   Constitutional:       Appearance: Normal appearance.   HENT:      Head: Normocephalic and atraumatic.      Right Ear: Tympanic membrane, ear canal and external ear normal.      Left Ear: Tympanic membrane, ear canal and external ear normal.      Nose: Nose normal.      Mouth/Throat:      Pharynx: Oropharynx is clear.   Eyes:      Extraocular Movements: Extraocular movements intact.      Conjunctiva/sclera: Conjunctivae normal.      Pupils: Pupils are equal, round, and reactive to light.   Cardiovascular:      Rate and Rhythm: Normal rate and regular rhythm.      Pulses: Normal pulses.      Heart sounds: Normal heart sounds.   Pulmonary:      Effort: Pulmonary effort is normal.      Breath sounds: Normal breath sounds.   Abdominal:      General: Abdomen is flat. Bowel sounds are normal.      Palpations: Abdomen is soft.   Musculoskeletal:      Cervical back: Normal range of motion and neck supple.   Skin:     General: Skin is warm and dry.   Neurological:      General: No focal deficit present.      Mental Status: He is alert and oriented to person, place, and time.   Psychiatric:         Mood and Affect: Mood normal.         Last Recorded Vitals  Blood pressure 125/74, pulse 60, temperature 37 °C (98.6 °F), temperature source Temporal, resp. rate 20, height 1.803 m (5' 10.98\"), weight 78.1 kg (172 lb 2.9 oz), SpO2 93 %.  Intake/Output last 3 Shifts:  I/O last 3 completed shifts:  In: 360 (4.6 mL/kg) [P.O.:360]  Out: - (0 mL/kg)   Weight: 78.1 kg     Relevant Results              Scheduled medications  apixaban, 5 mg, oral, BID  docusate sodium, 100 mg, oral, BID  icosapent ethyL, 1 capsule, oral, Daily  metoprolol tartrate, 12.5 mg, oral, " BID  pantoprazole, 40 mg, oral, Daily   Or  pantoprazole, 40 mg, intravenous, Daily  sulfur hexafluoride microsphr, 2 mL, intravenous, Once      Continuous medications     PRN medications  PRN medications: acetaminophen  Results for orders placed or performed during the hospital encounter of 11/09/23 (from the past 24 hour(s))   TSH   Result Value Ref Range    Thyroid Stimulating Hormone 1.21 0.27 - 4.20 mIU/L   Vitamin B12   Result Value Ref Range    Vitamin B12 495 211 - 946 pg/mL   Lipid Panel   Result Value Ref Range    Cholesterol 162 133 - 200 mg/dL    HDL-Cholesterol 55.0 >40.0 mg/dL    Cholesterol/HDL Ratio 2.9 SEE COMMENT    LDL Calculated 88 65 - 130 mg/dL    Triglycerides 95 40 - 150 mg/dL   Hemoglobin A1C   Result Value Ref Range    Hemoglobin A1C 5.4 See below %    Estimated Average Glucose 108 Not Established mg/dL                    Assessment/Plan   Principal Problem:    Acute CVA (cerebrovascular accident) (CMS/[facility])  Active Problems:    Atrial fibrillation (CMS/[facility])    Essential hypertension    Patient has recovered very well from the stroke  Telemetry has few pauses noted by nurse  Cardiology to address   to arrange social issues regarding his discharge       I spent  minutes in the professional and overall care of this patient.      Kati Langley MD
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
## 2023-11-11 NOTE — PROGRESS NOTES
Occupational Therapy    Evaluation    Patient Name: Alvin Whitmore  MRN: [id number]  Today's Date: 11/11/2023  Time Calculation  Start Time: 0920  Stop Time: 0940  Time Calculation (min): 20 min    Assessment  IP OT Assessment  OT Assessment: Pt appears to be functioning at his baseline and reports no current symptoms that presented upon admit.  Evaluation/Treatment Tolerance: Patient tolerated treatment well  End of Session Communication: Bedside nurse  End of Session Patient Position: Up in chair    Plan:  No Skilled OT: No acute OT goals identified  OT Discharge Recommendations: No further acute OT  OT - OK to Discharge: Yes    Subjective   Current Problem:  1. Acute CVA (cerebrovascular accident) (CMS/HCC)  Carotid duplex bilateral    Carotid duplex bilateral    Transthoracic Echo (TTE) Complete    Transthoracic Echo (TTE) Complete    CANCELED: Transthoracic Echo (TTE) Complete    CANCELED: Transthoracic Echo (TTE) Complete      2. Suspected cerebrovascular accident (CVA)  Carotid duplex bilateral    Carotid duplex bilateral      3. Chronic a-fib (CMS/HCC)  Transthoracic Echo (TTE) Complete    Transthoracic Echo (TTE) Complete    CANCELED: Transthoracic Echo (TTE) Complete    CANCELED: Transthoracic Echo (TTE) Complete      4. Other transient cerebral ischemic attacks and related syndromes  Transthoracic Echo (TTE) Complete        General:  General  Reason for Referral: LUE weakness, L facial numbness, slurred speech, impaired ADL's  Referred By: Dr. GINNY Langley  Past Medical History Relevant to Rehab: TIA, a-fib, prostate CA  Family/Caregiver Present: No  Prior to Session Communication: Bedside nurse  Patient Position Received: Up in chair  General Comment: Cleared by nurse prior to session and pt agreeable to OT eval.    Precautions:  Medical Precautions: Fall precautions    Pain:  Pain Assessment  Pain Assessment: 0-10  Pain Score: 0 - No pain    Objective     Cognition:  Overall Cognitive Status: Within  Functional Limits  Orientation Level: Oriented X4  Cognition Comments: pt reports memory deficits affecting some tasks at home.    Home Living:  Type of Home: House  Lives With: Alone  Home Adaptive Equipment: Walker rolling or standard, Cane, Wheelchair-manual  Home Layout: Multi-level  Alternate Level Stairs-Rails:  (1 rail)  Alternate Level Stairs-Number of Steps: 6 down and 7 up  Home Access: Stairs to enter with rails  Entrance Stairs-Rails:  (1 single rail)  Entrance Stairs-Number of Steps: 2-3  Bathroom Shower/Tub: Tub/shower unit  Bathroom Toilet: Handicapped height     Prior Function:  Level of [place]: Independent with ADLs and functional transfers, Independent with homemaking with ambulation  Receives Help From: Friends (other Veterans)  ADL Assistance: Independent  Homemaking Assistance: Independent  Ambulatory Assistance: Independent  Vocational: Retired  Hand Dominance: Right    ADL:  Eating Assistance: Independent  Grooming Assistance: Independent  Bathing Assistance: Independent  UE Dressing Assistance: Independent  LE Dressing Assistance: Independent  Toileting Assistance with Device: Independent  Toileting Deficit:  (pt has been up ad milagro to the bathroom)    Activity Tolerance:  Activity Tolerance Comments: WFL    Bed Mobility/Transfers: Bed Mobility  Bed Mobility: No    Transfers  Transfer:  (Pt completed all functional transfers independently. Pt also tolerated functional mobility for a household distance using no device independently.)    Vision: Vision - Basic Assessment  Current Vision: Wears glasses only for reading    Sensation:  Sensation Comment: pt reports numbness in digits 1-3 on LUE since a prior TIA    Strength:  Strength Comments: BUE's WFL    Coordination:  Coordination Comment: BUE's WFL     Hand Function:  Hand Function  Gross Grasp: Functional  Coordination: Functional        Outcome Measures: [facility] Daily Activity  Putting on and taking off regular lower body clothing:  None  Bathing (including washing, rinsing, drying): None  Putting on and taking off regular upper body clothing: None  Toileting, which includes using toilet, bedpan or urinal: None  Taking care of personal grooming such as brushing teeth: None  Eating Meals: None  Daily Activity - Total Score: 24      Education Documentation  No documentation found.  Education Comments  No comments found.

## 2023-11-11 NOTE — NURSING NOTE
Received patient Up to chair conversant to roommate. Report done at bedside. Assessed and charted, No complaint made. Will monitor.

## 2023-11-11 NOTE — PROGRESS NOTES
Physical Therapy    Physical Therapy Evaluation    Patient Name: Alvin Whitmore  MRN: [id number]  Today's Date: 11/11/2023   Time Calculation  Start Time: 0923  Stop Time: 0940  Time Calculation (min): 17 min    Assessment/Plan   PT Assessment  Rehab Prognosis: Excellent  Evaluation/Treatment Tolerance: Patient tolerated treatment well  Medical Staff Made Aware: Yes  Strengths: Attitude of self, Premorbid level of function, Physical health  End of Session Communication: Bedside nurse  Assessment Comment: Pt demonstrates no deficits that impair his ability from his baseline.  He is independent with all mobility and requires now further acute PT needs  End of Session Patient Position: Up in chair  IP OR SWING BED PT PLAN  Inpatient or Swing Bed: Inpatient  PT Plan  PT Plan: PT Eval only  PT Eval Only Reason: No acute PT needs identified  PT Discharge Recommendations: No further acute PT  PT Recommended Transfer Status: Independent  PT - OK to Discharge: Yes      Subjective   General Visit Information:  General  Reason for Referral: LUE weakness and slured speech  Past Medical History Relevant to Rehab: TIA, a-fib, prostate CA  Prior to Session Communication: Bedside nurse  Patient Position Received: Up in chair  General Comment: pt is agreeable to therapy, tele and SPO2 monitor donned  Home Living:     Prior Level of Function:  Prior Function Per Pt/Caregiver Report  Level of [place]: Independent with ADLs and functional transfers, Independent with homemaking with ambulation  ADL Assistance: Independent  Homemaking Assistance: Independent  Ambulatory Assistance: Independent  Vocational: Retired  Precautions:  Precautions  Medical Precautions: Fall precautions  Vital Signs:       Objective   Pain:  Pain Assessment  Pain Assessment: 0-10  Pain Score: 0 - No pain  Cognition:  Cognition  Overall Cognitive Status: Within Functional Limits  Orientation Level: Oriented X4  Attention: Within Functional Limits  Memory:   (pt reports mild memory deficits)    General Assessments:                Activity Tolerance  Endurance: Endurance does not limit participation in activity    Sensation  Light Touch: 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)                Coordination  Movements are Fluid and Coordinated: Yes  Finger to Nose: Intact  Rapid Alternating Movements: Intact  Alternating Toe Taps: Intact  Finger to Target: Intact    Postural Control  Postural Control: Within Functional Limits    Static Sitting Balance  Static Sitting-Balance Support: No upper extremity supported  Static Sitting-Level of Assistance: Independent  Dynamic Sitting Balance  Dynamic Sitting-Balance Support: No upper extremity supported    Static Standing Balance  Static Standing-Balance Support: No upper extremity supported  Static Standing-Level of Assistance: Independent  Functional Assessments:  Bed Mobility  Bed Mobility: Yes  Bed Mobility 1  Bed Mobility 1: Supine to sitting  Level of Assistance 1: Independent    Transfers  Transfer: Yes  Transfer 1  Transfer From 1: Bed to  Transfer to 1: Stand  Transfer Level of Assistance 1: Independent    Ambulation/Gait Training  Ambulation/Gait Training Performed: Yes  Extremity/Trunk Assessments:  Cervical Spine   Cervical Spine: Exceptions to Functional Limits  Cervical Spine Comment: increased forward flexion  Lumbar Spine   Lumbar Spine : Within Functional Limits  RUE   RUE : Within Functional Limits  LUE   LUE: Within Functional Limits  RLE   RLE : Within Functional Limits  LLE   LLE : Within Functional Limits  Outcome Measures:  [facility] Basic Mobility  Turning from your back to your side while in a flat bed without using bedrails: None  Moving from lying on your back to sitting on the side of a flat bed without using bedrails: None  Moving to and from bed to chair (including a wheelchair): None  Standing up from a chair using your arms (e.g. wheelchair or bedside chair): None  To walk in hospital  room: None  Climbing 3-5 steps with railing: None  Basic Mobility - Total Score: 24        Education Documentation  Mobility Training, taught by Christophe Bartlett PT at 11/11/2023 10:12 AM.  Learner: Patient  Readiness: Eager  Method: Explanation  Response: Verbalizes Understanding, Demonstrated Understanding    Education Comments  No comments found.

## 2023-11-11 NOTE — CARE PLAN
The patient's goals for the shift include going home     The clinical goals for the shift include Maintain patient safety    Problem: General Stroke  Goal: Demonstrate improvement in neurological exam throughout the shift  Outcome: Progressing  Goal: Maintain BP within ordered limits throughout shift  Outcome: Progressing  Goal: Participate in treatment (ie., meds, therapy) throughout shift  Outcome: Progressing  Goal: No symptoms of aspiration throughout shift  Outcome: Progressing  Goal: No symptoms of hemorrhage throughout shift  Outcome: Progressing  Goal: Decreased nausea/vomiting throughout shift  Outcome: Progressing  Goal: Controlled blood glucose throughout shift  Outcome: Progressing  Goal: Out of bed three times today  Outcome: Met   Patient is OOB independently most of the day and ambulates to restroom without assistance     Problem: ICU Stroke  Goal: Maintain patent airway throughout shift  Outcome: Progressing  Goal: Achieve/maintain targeted sodium level throughout shift  Outcome: Progressing     Problem: Skin  Goal: Participates in plan/prevention/treatment measures  Outcome: Progressing  Goal: Prevent/manage excess moisture  Outcome: Progressing  Goal: Prevent/minimize sheer/friction injuries  Outcome: Progressing

## 2023-11-11 NOTE — NURSING NOTE
"Dr. KEANU Langley in, aware of bradycardia with pauses that are occurring and occurred at this same time yesterday afternoon.  Cardiology had been consulted on this patient and Dr. Langley said to let them know and they can decide. I did her know that cardiology had \"signed off\" on the case       Patient also does not have a ride home, any clothing or his cell phone (for list of contacts) Yesterday I tried to obtain a phone number of keanu Rosas,  unable to find a \"free\" listing to obtain phone number.   Dr. Langley stated that social service can take care of the above.  "

## 2023-11-12 VITALS
WEIGHT: 172.18 LBS | RESPIRATION RATE: 18 BRPM | SYSTOLIC BLOOD PRESSURE: 138 MMHG | BODY MASS INDEX: 24.1 KG/M2 | OXYGEN SATURATION: 92 % | DIASTOLIC BLOOD PRESSURE: 91 MMHG | HEART RATE: 68 BPM | HEIGHT: 71 IN | TEMPERATURE: 97.7 F

## 2023-11-12 PROCEDURE — 99232 SBSQ HOSP IP/OBS MODERATE 35: CPT | Performed by: INTERNAL MEDICINE

## 2023-11-12 PROCEDURE — 2500000004 HC RX 250 GENERAL PHARMACY W/ HCPCS (ALT 636 FOR OP/ED): Performed by: INTERNAL MEDICINE

## 2023-11-12 PROCEDURE — 2500000001 HC RX 250 WO HCPCS SELF ADMINISTERED DRUGS (ALT 637 FOR MEDICARE OP): Performed by: INTERNAL MEDICINE

## 2023-11-12 PROCEDURE — 99238 HOSP IP/OBS DSCHRG MGMT 30/<: CPT | Performed by: INTERNAL MEDICINE

## 2023-11-12 RX ADMIN — PANTOPRAZOLE SODIUM 40 MG: 40 TABLET, DELAYED RELEASE ORAL at 09:07

## 2023-11-12 RX ADMIN — DOCUSATE SODIUM 100 MG: 100 CAPSULE, LIQUID FILLED ORAL at 09:00

## 2023-11-12 RX ADMIN — ICOSAPENT ETHYL 1 G: 1 CAPSULE ORAL at 09:07

## 2023-11-12 RX ADMIN — APIXABAN 5 MG: 5 TABLET, FILM COATED ORAL at 09:07

## 2023-11-12 ASSESSMENT — COGNITIVE AND FUNCTIONAL STATUS - GENERAL
DAILY ACTIVITIY SCORE: 24
MOBILITY SCORE: 24

## 2023-11-12 ASSESSMENT — PAIN SCALES - WONG BAKER: WONGBAKER_NUMERICALRESPONSE: NO HURT

## 2023-11-12 ASSESSMENT — PAIN SCALES - GENERAL: PAINLEVEL_OUTOF10: 0 - NO PAIN

## 2023-11-12 NOTE — CARE PLAN
Problem: General Stroke  Goal: Demonstrate improvement in neurological exam throughout the shift  Outcome: Progressing  Goal: Maintain BP within ordered limits throughout shift  Outcome: Progressing  Goal: Participate in treatment (ie., meds, therapy) throughout shift  Outcome: Progressing  Goal: No symptoms of aspiration throughout shift  Outcome: Progressing  Goal: No symptoms of hemorrhage throughout shift  Outcome: Progressing  Goal: Decreased nausea/vomiting throughout shift  Outcome: Progressing  Goal: Controlled blood glucose throughout shift  Outcome: Progressing     Problem: ICU Stroke  Goal: Maintain patent airway throughout shift  Outcome: Progressing  Goal: Achieve/maintain targeted sodium level throughout shift  Outcome: Progressing     Problem: Skin  Goal: Participates in plan/prevention/treatment measures  Outcome: Progressing  Goal: Prevent/manage excess moisture  Outcome: Progressing  Goal: Prevent/minimize sheer/friction injuries  Outcome: Progressing   The patient's goals for the shift include      The clinical goals for the shift include Maintain patient safety    Over the shift, the patient did not make progress toward the following goals. Barriers to progression include . Recommendations to address these barriers include .

## 2023-11-12 NOTE — PROGRESS NOTES
Plan is discharge home today. Patient has no ride home.  Son who is on facesheet has no phone number listed.  Patient brought in by squad emergently so his phone was left at home.  Patient cannot recall the phone numbers of his friends or son.  Patient is able to ambulate without assistance . States he has a code to get into his home. [employer] shuttle arranged. [facility] will pick patient up at 1645 in ED [facility].                            Georgia Rothman RN

## 2023-11-12 NOTE — CARE PLAN
To The patient's goals for the shift include  to go home    The clinical goals for the shift include patient safety and assist with discharge planning    Goals met patient stable for discharge

## 2023-11-12 NOTE — PROGRESS NOTES
"Alvin Whitmore is a 89 y.o. male on day 3 of admission presenting with Acute CVA (cerebrovascular accident) (CMS/[facility]).    Subjective   Patient still remains quite forgetful.  Following commands alert oriented       Objective     Physical Exam  Vitals reviewed.   Constitutional:       Appearance: Normal appearance.   HENT:      Head: Normocephalic and atraumatic.      Right Ear: Tympanic membrane, ear canal and external ear normal.      Left Ear: Tympanic membrane, ear canal and external ear normal.      Nose: Nose normal.      Mouth/Throat:      Pharynx: Oropharynx is clear.   Eyes:      Extraocular Movements: Extraocular movements intact.      Conjunctiva/sclera: Conjunctivae normal.      Pupils: Pupils are equal, round, and reactive to light.   Cardiovascular:      Rate and Rhythm: Normal rate and regular rhythm.      Pulses: Normal pulses.      Heart sounds: Normal heart sounds.   Pulmonary:      Effort: Pulmonary effort is normal.      Breath sounds: Normal breath sounds.   Abdominal:      General: Abdomen is flat. Bowel sounds are normal.      Palpations: Abdomen is soft.   Musculoskeletal:      Cervical back: Normal range of motion and neck supple.   Skin:     General: Skin is warm and dry.   Neurological:      General: No focal deficit present.      Mental Status: He is alert and oriented to person, place, and time.   Psychiatric:         Mood and Affect: Mood normal.         Last Recorded Vitals  Blood pressure (!) 138/91, pulse 68, temperature 36.5 °C (97.7 °F), temperature source Oral, resp. rate 18, height 1.803 m (5' 10.98\"), weight 78.1 kg (172 lb 2.9 oz), SpO2 92 %.  Intake/Output last 3 Shifts:  I/O last 3 completed shifts:  In: 360 (4.6 mL/kg) [P.O.:360]  Out: - (0 mL/kg)   Weight: 78.1 kg     Relevant Results              Scheduled medications  apixaban, 5 mg, oral, BID  docusate sodium, 100 mg, oral, BID  icosapent ethyL, 1 capsule, oral, Daily  metoprolol tartrate, 12.5 mg, oral, " BID  pantoprazole, 40 mg, oral, Daily   Or  pantoprazole, 40 mg, intravenous, Daily  sulfur hexafluoride microsphr, 2 mL, intravenous, Once      Continuous medications     PRN medications  PRN medications: acetaminophen                   Assessment/Plan   Principal Problem:    Acute CVA (cerebrovascular accident) (CMS/HCC)  Active Problems:    Atrial fibrillation (CMS/HCC)    Essential hypertension    Patient is neurologically stable  Cardiac status stable  Concern raised to the nurse supervisor regarding patient's safety with his memory living alone  We will discharge patient home       I spent  minutes in the professional and overall care of this patient.      Kati Langley MD

## 2023-11-13 ENCOUNTER — HOSPITAL ENCOUNTER (OUTPATIENT)
Dept: CARDIOLOGY | Facility: HOSPITAL | Age: 88
Discharge: HOME | End: 2023-11-13
Payer: MEDICARE

## 2023-11-13 LAB
Q ONSET: 228 MS
QRS COUNT: 10 BEATS
QRS DURATION: 90 MS
QT INTERVAL: 448 MS
QTC CALCULATION(BAZETT): 443 MS
QTC FREDERICIA: 445 MS
R AXIS: 40 DEGREES
T AXIS: 73 DEGREES
T OFFSET: 452 MS
VENTRICULAR RATE: 59 BPM

## 2023-11-13 PROCEDURE — 93005 ELECTROCARDIOGRAM TRACING: CPT

## 2023-11-14 NOTE — DISCHARGE SUMMARY
Discharge Diagnosis  Acute CVA (cerebrovascular accident) (CMS/[facility])    Issues Requiring Follow-Up  Acute stroke  Atrial fibrillation  Test Results Pending At Discharge  Pending Labs       No current pending labs.            Hospital Course   Alvin Whitmore is a 89 y.o. male presenting with left arm weakness.  Patient lives home alone.  He has missed taking Eliquis few times.  He was watching TV and fall asleep.  When he woke up he noticed he could not lift his left arm.  He called 911.  He also noticed some left face numbness and slurred speech.  But his symptoms started resolving by the time he was in the emergency room.  He is now feeling back to his baseline.  He has history of multiple TIAs in the past  Work-up for stroke including MRI confirmed acute stroke.  Patient recovered completely from the stroke.  He was encouraged to be compliant with the anticoagulants    Pertinent Physical Exam At Time of Discharge  Physical Exam  Vitals reviewed.   Constitutional:       Appearance: Normal appearance.   HENT:      Head: Normocephalic and atraumatic.      Right Ear: Tympanic membrane, ear canal and external ear normal.      Left Ear: Tympanic membrane, ear canal and external ear normal.      Nose: Nose normal.      Mouth/Throat:      Pharynx: Oropharynx is clear.   Eyes:      Extraocular Movements: Extraocular movements intact.      Conjunctiva/sclera: Conjunctivae normal.      Pupils: Pupils are equal, round, and reactive to light.   Cardiovascular:      Rate and Rhythm: Normal rate and regular rhythm.      Pulses: Normal pulses.      Heart sounds: Normal heart sounds.   Pulmonary:      Effort: Pulmonary effort is normal.      Breath sounds: Normal breath sounds.   Abdominal:      General: Abdomen is flat. Bowel sounds are normal.      Palpations: Abdomen is soft.   Musculoskeletal:      Cervical back: Normal range of motion and neck supple.   Skin:     General: Skin is warm and dry.   Neurological:      General:  No focal deficit present.      Mental Status: He is alert and oriented to person, place, and time.   Psychiatric:         Mood and Affect: Mood normal.         Home Medications     Medication List      CONTINUE taking these medications     acetaminophen 325 mg tablet; Commonly known as: Tylenol   apixaban 5 mg tablet; Commonly known as: Eliquis   fish oil concentrate 120-180 mg capsule; Commonly known as: Omega-3   metoprolol tartrate 25 mg tablet; Commonly known as: Lopressor   metroNIDAZOLE 0.75 % gel; Commonly known as: Metrogel     STOP taking these medications     enoxaparin 80 mg/0.8 mL syringe; Commonly known as: Lovenox   warfarin 3 mg tablet; Commonly known as: Coumadin       Outpatient Follow-Up  Future Appointments   Date Time Provider Department Center   1/8/2024 10:00 AM Mauro Gleason MD [address]       Kati Langley MD

## 2024-01-08 ENCOUNTER — OFFICE VISIT (OUTPATIENT)
Dept: CARDIOLOGY | Facility: CLINIC | Age: 89
End: 2024-01-08
Payer: MEDICARE

## 2024-01-08 VITALS
HEART RATE: 77 BPM | OXYGEN SATURATION: 99 % | BODY MASS INDEX: 23.58 KG/M2 | DIASTOLIC BLOOD PRESSURE: 62 MMHG | SYSTOLIC BLOOD PRESSURE: 124 MMHG | WEIGHT: 169 LBS

## 2024-01-08 DIAGNOSIS — I48.21 PERMANENT ATRIAL FIBRILLATION (MULTI): Primary | ICD-10-CM

## 2024-01-08 DIAGNOSIS — I10 ESSENTIAL HYPERTENSION: ICD-10-CM

## 2024-01-08 PROCEDURE — 1126F AMNT PAIN NOTED NONE PRSNT: CPT | Performed by: INTERNAL MEDICINE

## 2024-01-08 PROCEDURE — 99213 OFFICE O/P EST LOW 20 MIN: CPT | Performed by: INTERNAL MEDICINE

## 2024-01-08 PROCEDURE — 3078F DIAST BP <80 MM HG: CPT | Performed by: INTERNAL MEDICINE

## 2024-01-08 PROCEDURE — 1160F RVW MEDS BY RX/DR IN RCRD: CPT | Performed by: INTERNAL MEDICINE

## 2024-01-08 PROCEDURE — 3074F SYST BP LT 130 MM HG: CPT | Performed by: INTERNAL MEDICINE

## 2024-01-08 PROCEDURE — 1159F MED LIST DOCD IN RCRD: CPT | Performed by: INTERNAL MEDICINE

## 2024-01-08 PROCEDURE — 1036F TOBACCO NON-USER: CPT | Performed by: INTERNAL MEDICINE

## 2024-01-08 ASSESSMENT — PATIENT HEALTH QUESTIONNAIRE - PHQ9
1. LITTLE INTEREST OR PLEASURE IN DOING THINGS: NOT AT ALL
SUM OF ALL RESPONSES TO PHQ9 QUESTIONS 1 AND 2: 0
2. FEELING DOWN, DEPRESSED OR HOPELESS: NOT AT ALL

## 2024-01-08 ASSESSMENT — ENCOUNTER SYMPTOMS
LOSS OF SENSATION IN FEET: 0
RESPIRATORY NEGATIVE: 1
CONSTITUTIONAL NEGATIVE: 1
NEUROLOGICAL NEGATIVE: 1
ABDOMINAL PAIN: 0
MUSCULOSKELETAL NEGATIVE: 1
CHILLS: 0
ENDOCRINE NEGATIVE: 1
COUGH: 0
EYES NEGATIVE: 1
DEPRESSION: 0
GASTROINTESTINAL NEGATIVE: 1
OCCASIONAL FEELINGS OF UNSTEADINESS: 1

## 2024-01-08 ASSESSMENT — PAIN SCALES - GENERAL: PAINLEVEL: 0-NO PAIN

## 2024-01-08 NOTE — PROGRESS NOTES
Subjective      Chief Complaint   Patient presents with    6 month follow up     Atrial fibrillation          Patient has a history of atrial fibrillation and has been maintained on Eliquis and rate control for quite some time.  There is a history of prostate cancer and had seed implant in 2006  He had a nose bleed and is doing alright with it.  Is weaker and the leg strength is not as good.  He is not complaining of chest discomfort.  NO PND or orthopnea.  The legs are not swelling on him.  He does not complain of palpitations.  The chol is doing well  The BP is doing well           Review of Systems   Constitutional: Negative. Negative for chills.   HENT:  Positive for nosebleeds.    Eyes: Negative.    Respiratory: Negative.  Negative for cough.    Endocrine: Negative.    Skin: Negative.    Musculoskeletal: Negative.    Gastrointestinal: Negative.  Negative for abdominal pain.   Genitourinary: Negative.    Neurological: Negative.    All other systems reviewed and are negative.       Past Surgical History:   Procedure Laterality Date    MR HEAD ANGIO WO IV CONTRAST  12/21/2017    MR HEAD ANGIO WO IV CONTRAST [facility]        Active Ambulatory Problems     Diagnosis Date Noted    Atrial fibrillation (CMS/[facility]) 09/05/2023    Essential hypertension 09/05/2023    Malignant tumor of prostate (CMS/[facility]) 09/05/2023    Peripheral edema 09/05/2023    Acute CVA (cerebrovascular accident) (CMS/[facility]) 09/05/2023     Resolved Ambulatory Problems     Diagnosis Date Noted    No Resolved Ambulatory Problems     Past Medical History:   Diagnosis Date    Cancer (CMS/[facility])     Hypertension     TIA (transient ischemic attack) 01/01/1996        Visit Vitals  /62   Pulse 77   Wt 76.7 kg (169 lb)   SpO2 99%   BMI 23.58 kg/m²   Smoking Status Former   BSA 1.96 m²        Objective     Constitutional:       Appearance: Healthy appearance.   Eyes:      Pupils: Pupils are equal, round, and reactive to light.   Neck:      Vascular:  "JVD normal.   Pulmonary:      Breath sounds: Normal breath sounds.   Cardiovascular:      PMI at left midclavicular line. Normal rate. Irregularly irregular rhythm. Normal S1. Normal S2.       Murmurs: There is no murmur.      No gallop.  No click. No rub.   Pulses:     Intact distal pulses.   Edema:     Peripheral edema absent.   Abdominal:      Palpations: Abdomen is soft.      Tenderness: There is no abdominal tenderness.   Musculoskeletal:      Extremities: No clubbing present.Skin:     General: Skin is warm and dry.   Neurological:      General: No focal deficit present.            Lab Review:         Lab Results   Component Value Date    CHOL 162 11/10/2023     Lab Results   Component Value Date    HDL 55.0 11/10/2023     Lab Results   Component Value Date    LDLCALC 88 11/10/2023     Lab Results   Component Value Date    TRIG 95 11/10/2023     No components found for: \"CHOLHDL\"     Assessment/Plan     Atrial fibrillation (CMS/HCC)  Is doing well and is active.  NO angina and no chf.  He will get nose bleeds at times.    Essential hypertension  Is doing well     "

## 2024-07-22 ENCOUNTER — APPOINTMENT (OUTPATIENT)
Dept: CARDIOLOGY | Facility: CLINIC | Age: 89
End: 2024-07-22
Payer: MEDICARE

## 2024-07-22 NOTE — PROGRESS NOTES
Subjective      Chief Complaint   Patient presents with    Follow-up          Patient has a history of atrial fibrillation and has been maintained on Eliquis and rate control for quite some time.  There is a history of prostate cancer and had seed implant in 2006  Says is active.  He is not complaining of chest discomfort.  NO PND or orthopnea.  The legs are not swelling on him.  He does not complain of palpitations.  The BP is doing well  He can't tell when he has the afib           Review of Systems   Constitutional: Negative. Negative for chills and fever.   HENT:  Positive for nosebleeds.    Eyes: Negative.    Respiratory: Negative.  Negative for cough.    Endocrine: Negative.    Skin: Negative.    Musculoskeletal: Negative.    Gastrointestinal: Negative.    Genitourinary: Negative.    Neurological: Negative.    All other systems reviewed and are negative.       Past Surgical History:   Procedure Laterality Date    MR HEAD ANGIO WO IV CONTRAST  12/21/2017    MR HEAD ANGIO WO IV CONTRAST [facility]        Active Ambulatory Problems     Diagnosis Date Noted    Atrial fibrillation (Multi) 09/05/2023    Essential hypertension 09/05/2023    Malignant tumor of prostate (Multi) 09/05/2023    Peripheral edema 09/05/2023    Acute CVA (cerebrovascular accident) (Multi) 09/05/2023     Resolved Ambulatory Problems     Diagnosis Date Noted    No Resolved Ambulatory Problems     Past Medical History:   Diagnosis Date    Cancer (Multi)     Hypertension     TIA (transient ischemic attack) 01/01/1996        Visit Vitals  /64   Pulse 68   Wt 75.3 kg (166 lb)   SpO2 100%   BMI 23.16 kg/m²   Smoking Status Former   BSA 1.94 m²        Objective     Constitutional:       Appearance: Healthy appearance.   Eyes:      Pupils: Pupils are equal, round, and reactive to light.   Neck:      Vascular: No JVR. JVD normal.   Pulmonary:      Effort: Pulmonary effort is normal.      Breath sounds: Normal breath sounds.  "  Cardiovascular:      PMI at left midclavicular line. Normal rate. Irregularly irregular rhythm. Normal S1. Normal S2.       Murmurs: There is no murmur.      No gallop.  No click. No rub.   Pulses:     Intact distal pulses.   Edema:     Peripheral edema absent.   Abdominal:      Palpations: Abdomen is soft.      Tenderness: There is no abdominal tenderness.   Musculoskeletal: Normal range of motion.      Extremities: No clubbing present.Skin:     General: Skin is warm and dry.   Neurological:      General: No focal deficit present.            Lab Review:         Lab Results   Component Value Date    CHOL 162 11/10/2023     Lab Results   Component Value Date    HDL 55.0 11/10/2023     Lab Results   Component Value Date    LDLCALC 88 11/10/2023     Lab Results   Component Value Date    TRIG 95 11/10/2023     No components found for: \"CHOLHDL\"     Assessment/Plan     Atrial fibrillation (Multi)  He is doing well and is active.  No angina and no chf. The afib is stable.  He will get nose bleeds easily and will have him see ENT    Essential hypertension  is doing well     "

## 2024-07-23 ENCOUNTER — OFFICE VISIT (OUTPATIENT)
Dept: CARDIOLOGY | Facility: CLINIC | Age: 89
End: 2024-07-23
Payer: MEDICARE

## 2024-07-23 VITALS
HEART RATE: 68 BPM | BODY MASS INDEX: 23.16 KG/M2 | SYSTOLIC BLOOD PRESSURE: 118 MMHG | WEIGHT: 166 LBS | DIASTOLIC BLOOD PRESSURE: 64 MMHG | OXYGEN SATURATION: 100 %

## 2024-07-23 DIAGNOSIS — I10 ESSENTIAL HYPERTENSION: ICD-10-CM

## 2024-07-23 DIAGNOSIS — I48.21 PERMANENT ATRIAL FIBRILLATION (MULTI): Primary | ICD-10-CM

## 2024-07-23 PROCEDURE — 3074F SYST BP LT 130 MM HG: CPT | Performed by: INTERNAL MEDICINE

## 2024-07-23 PROCEDURE — 99213 OFFICE O/P EST LOW 20 MIN: CPT | Performed by: INTERNAL MEDICINE

## 2024-07-23 PROCEDURE — 1126F AMNT PAIN NOTED NONE PRSNT: CPT | Performed by: INTERNAL MEDICINE

## 2024-07-23 PROCEDURE — 3078F DIAST BP <80 MM HG: CPT | Performed by: INTERNAL MEDICINE

## 2024-07-23 PROCEDURE — 1159F MED LIST DOCD IN RCRD: CPT | Performed by: INTERNAL MEDICINE

## 2024-07-23 PROCEDURE — 1036F TOBACCO NON-USER: CPT | Performed by: INTERNAL MEDICINE

## 2024-07-23 ASSESSMENT — ENCOUNTER SYMPTOMS
OCCASIONAL FEELINGS OF UNSTEADINESS: 1
FEVER: 0
CONSTITUTIONAL NEGATIVE: 1
LOSS OF SENSATION IN FEET: 0
RESPIRATORY NEGATIVE: 1
NEUROLOGICAL NEGATIVE: 1
COUGH: 0
EYES NEGATIVE: 1
ENDOCRINE NEGATIVE: 1
CHILLS: 0
MUSCULOSKELETAL NEGATIVE: 1
GASTROINTESTINAL NEGATIVE: 1

## 2024-07-23 ASSESSMENT — PAIN SCALES - GENERAL: PAINLEVEL: 0-NO PAIN

## 2024-07-23 NOTE — ASSESSMENT & PLAN NOTE
He is doing well and is active.  No angina and no chf. The afib is stable.  He will get nose bleeds easily and will have him see ENT

## 2025-02-02 NOTE — PROGRESS NOTES
Subjective      Chief Complaint   Patient presents with    6 month follow up             Patient has a history of atrial fibrillation and has been maintained on Eliquis and rate control for quite some time.  There is a history of prostate cancer and had seed implant in 2006  He is not too active  He is not complaining of chest discomfort.  NO PND or orthopnea.  The legs are swelling on him.  He does not complain of palpitations.  The legs come down in the am           ROS     Past Surgical History:   Procedure Laterality Date    MR HEAD ANGIO WO IV CONTRAST  12/21/2017    MR HEAD ANGIO WO IV CONTRAST [facility]        Active Ambulatory Problems     Diagnosis Date Noted    Atrial fibrillation (Multi) 09/05/2023    Essential hypertension 09/05/2023    Malignant tumor of prostate (Multi) 09/05/2023    Peripheral edema 09/05/2023    Acute CVA (cerebrovascular accident) (Multi) 09/05/2023     Resolved Ambulatory Problems     Diagnosis Date Noted    No Resolved Ambulatory Problems     Past Medical History:   Diagnosis Date    Cancer (Multi)     Hypertension     TIA (transient ischemic attack) 01/01/1996        Visit Vitals  /82   Pulse 52   Wt 74.8 kg (165 lb)   SpO2 99%   BMI 23.02 kg/m²   Smoking Status Former   BSA 1.94 m²        Objective     Constitutional:       Appearance: Healthy appearance.   Eyes:      Pupils: Pupils are equal, round, and reactive to light.   Neck:      Vascular: No JVR. JVD normal.   Pulmonary:      Effort: Pulmonary effort is normal.      Breath sounds: Normal breath sounds.   Cardiovascular:      PMI at left midclavicular line. Normal rate. Irregularly irregular rhythm. Normal S1. Normal S2.       Murmurs: There is no murmur.      No gallop.  No click. No rub.      Comments: Chronic venous stasis changes  Pulses:     Intact distal pulses.   Edema:     Peripheral edema present.     Ankle: bilateral 1+ edema of the ankle.  Abdominal:      Palpations: Abdomen is soft.       "Tenderness: There is no abdominal tenderness.   Musculoskeletal: Normal range of motion.      Extremities: No clubbing present.Skin:     General: Skin is warm and dry.   Neurological:      General: No focal deficit present.            Lab Review:         Lab Results   Component Value Date    CHOL 162 11/10/2023     Lab Results   Component Value Date    HDL 55.0 11/10/2023     Lab Results   Component Value Date    LDLCALC 88 11/10/2023     Lab Results   Component Value Date    TRIG 95 11/10/2023     No components found for: \"CHOLHDL\"     Assessment/Plan     Atrial fibrillation (Multi)  The afib is the same and no angina and no chf.  The legs do swell on him and is from the venous stasis.  He use  compression socks and helps    Essential hypertension  The BP is borderline     "

## 2025-02-03 ENCOUNTER — OFFICE VISIT (OUTPATIENT)
Dept: CARDIOLOGY | Facility: CLINIC | Age: OVER 89
End: 2025-02-03
Payer: MEDICARE

## 2025-02-03 VITALS
HEART RATE: 52 BPM | WEIGHT: 165 LBS | BODY MASS INDEX: 23.02 KG/M2 | DIASTOLIC BLOOD PRESSURE: 82 MMHG | SYSTOLIC BLOOD PRESSURE: 144 MMHG | OXYGEN SATURATION: 99 %

## 2025-02-03 DIAGNOSIS — I10 ESSENTIAL HYPERTENSION: ICD-10-CM

## 2025-02-03 DIAGNOSIS — I48.21 PERMANENT ATRIAL FIBRILLATION (MULTI): Primary | ICD-10-CM

## 2025-02-03 PROCEDURE — 1036F TOBACCO NON-USER: CPT | Performed by: INTERNAL MEDICINE

## 2025-02-03 PROCEDURE — 3079F DIAST BP 80-89 MM HG: CPT | Performed by: INTERNAL MEDICINE

## 2025-02-03 PROCEDURE — 99213 OFFICE O/P EST LOW 20 MIN: CPT | Performed by: INTERNAL MEDICINE

## 2025-02-03 PROCEDURE — 3077F SYST BP >= 140 MM HG: CPT | Performed by: INTERNAL MEDICINE

## 2025-02-03 PROCEDURE — 1126F AMNT PAIN NOTED NONE PRSNT: CPT | Performed by: INTERNAL MEDICINE

## 2025-02-03 ASSESSMENT — PATIENT HEALTH QUESTIONNAIRE - PHQ9
1. LITTLE INTEREST OR PLEASURE IN DOING THINGS: NOT AT ALL
2. FEELING DOWN, DEPRESSED OR HOPELESS: NOT AT ALL
SUM OF ALL RESPONSES TO PHQ9 QUESTIONS 1 AND 2: 0

## 2025-02-03 ASSESSMENT — ENCOUNTER SYMPTOMS
OCCASIONAL FEELINGS OF UNSTEADINESS: 0
DEPRESSION: 0
LOSS OF SENSATION IN FEET: 1

## 2025-02-03 ASSESSMENT — LIFESTYLE VARIABLES: TOTAL SCORE: 0

## 2025-02-03 ASSESSMENT — PAIN SCALES - GENERAL: PAINLEVEL_OUTOF10: 0-NO PAIN

## 2025-02-03 NOTE — ASSESSMENT & PLAN NOTE
The afib is the same and no angina and no chf.  The legs do swell on him and is from the venous stasis.  He use  compression socks and helps

## 2025-03-06 ENCOUNTER — APPOINTMENT (OUTPATIENT)
Dept: RADIOLOGY | Facility: HOSPITAL | Age: OVER 89
End: 2025-03-06
Payer: MEDICARE

## 2025-03-06 ENCOUNTER — HOSPITAL ENCOUNTER (OUTPATIENT)
Facility: HOSPITAL | Age: OVER 89
Setting detail: OBSERVATION
Discharge: HOME | End: 2025-03-08
Attending: STUDENT IN AN ORGANIZED HEALTH CARE EDUCATION/TRAINING PROGRAM | Admitting: INTERNAL MEDICINE
Payer: MEDICARE

## 2025-03-06 ENCOUNTER — APPOINTMENT (OUTPATIENT)
Dept: CARDIOLOGY | Facility: HOSPITAL | Age: OVER 89
End: 2025-03-06
Payer: MEDICARE

## 2025-03-06 DIAGNOSIS — H53.9 TRANSIENT VISION DISTURBANCE: ICD-10-CM

## 2025-03-06 DIAGNOSIS — G45.9 TIA (TRANSIENT ISCHEMIC ATTACK): Primary | ICD-10-CM

## 2025-03-06 LAB
ALBUMIN SERPL BCP-MCNC: 3.7 G/DL (ref 3.4–5)
ALP SERPL-CCNC: 43 U/L (ref 33–136)
ALT SERPL W P-5'-P-CCNC: 12 U/L (ref 10–52)
ANION GAP SERPL CALCULATED.3IONS-SCNC: 11 MMOL/L (ref 10–20)
AST SERPL W P-5'-P-CCNC: 26 U/L (ref 9–39)
BASOPHILS # BLD AUTO: 0.1 X10*3/UL (ref 0–0.1)
BASOPHILS NFR BLD AUTO: 1.6 %
BILIRUB SERPL-MCNC: 1.1 MG/DL (ref 0–1.2)
BUN SERPL-MCNC: 24 MG/DL (ref 6–23)
CALCIUM SERPL-MCNC: 9 MG/DL (ref 8.6–10.3)
CARDIAC TROPONIN I PNL SERPL HS: 8 NG/L (ref 0–20)
CARDIAC TROPONIN I PNL SERPL HS: 8 NG/L (ref 0–20)
CHLORIDE SERPL-SCNC: 104 MMOL/L (ref 98–107)
CO2 SERPL-SCNC: 28 MMOL/L (ref 21–32)
CREAT SERPL-MCNC: 0.93 MG/DL (ref 0.5–1.3)
EGFRCR SERPLBLD CKD-EPI 2021: 77 ML/MIN/1.73M*2
EOSINOPHIL # BLD AUTO: 0.27 X10*3/UL (ref 0–0.4)
EOSINOPHIL NFR BLD AUTO: 4.4 %
ERYTHROCYTE [DISTWIDTH] IN BLOOD BY AUTOMATED COUNT: 13.8 % (ref 11.5–14.5)
GLUCOSE SERPL-MCNC: 121 MG/DL (ref 74–99)
HCT VFR BLD AUTO: 36.6 % (ref 41–52)
HGB BLD-MCNC: 12.1 G/DL (ref 13.5–17.5)
IMM GRANULOCYTES # BLD AUTO: 0.01 X10*3/UL (ref 0–0.5)
IMM GRANULOCYTES NFR BLD AUTO: 0.2 % (ref 0–0.9)
LYMPHOCYTES # BLD AUTO: 1.58 X10*3/UL (ref 0.8–3)
LYMPHOCYTES NFR BLD AUTO: 25.8 %
MCH RBC QN AUTO: 31.6 PG (ref 26–34)
MCHC RBC AUTO-ENTMCNC: 33.1 G/DL (ref 32–36)
MCV RBC AUTO: 96 FL (ref 80–100)
MONOCYTES # BLD AUTO: 0.7 X10*3/UL (ref 0.05–0.8)
MONOCYTES NFR BLD AUTO: 11.4 %
NEUTROPHILS # BLD AUTO: 3.47 X10*3/UL (ref 1.6–5.5)
NEUTROPHILS NFR BLD AUTO: 56.6 %
NRBC BLD-RTO: 0 /100 WBCS (ref 0–0)
PLATELET # BLD AUTO: 189 X10*3/UL (ref 150–450)
POTASSIUM SERPL-SCNC: 4.7 MMOL/L (ref 3.5–5.3)
PROT SERPL-MCNC: 6.3 G/DL (ref 6.4–8.2)
RBC # BLD AUTO: 3.83 X10*6/UL (ref 4.5–5.9)
SODIUM SERPL-SCNC: 138 MMOL/L (ref 136–145)
WBC # BLD AUTO: 6.1 X10*3/UL (ref 4.4–11.3)

## 2025-03-06 PROCEDURE — 83880 ASSAY OF NATRIURETIC PEPTIDE: CPT | Performed by: STUDENT IN AN ORGANIZED HEALTH CARE EDUCATION/TRAINING PROGRAM

## 2025-03-06 PROCEDURE — 84484 ASSAY OF TROPONIN QUANT: CPT | Performed by: PHYSICIAN ASSISTANT

## 2025-03-06 PROCEDURE — 85025 COMPLETE CBC W/AUTO DIFF WBC: CPT | Performed by: PHYSICIAN ASSISTANT

## 2025-03-06 PROCEDURE — 93005 ELECTROCARDIOGRAM TRACING: CPT

## 2025-03-06 PROCEDURE — 36415 COLL VENOUS BLD VENIPUNCTURE: CPT | Performed by: PHYSICIAN ASSISTANT

## 2025-03-06 PROCEDURE — 99285 EMERGENCY DEPT VISIT HI MDM: CPT | Mod: 25 | Performed by: STUDENT IN AN ORGANIZED HEALTH CARE EDUCATION/TRAINING PROGRAM

## 2025-03-06 PROCEDURE — 70450 CT HEAD/BRAIN W/O DYE: CPT

## 2025-03-06 PROCEDURE — 80053 COMPREHEN METABOLIC PANEL: CPT | Performed by: PHYSICIAN ASSISTANT

## 2025-03-06 PROCEDURE — 71045 X-RAY EXAM CHEST 1 VIEW: CPT | Mod: FOREIGN READ | Performed by: RADIOLOGY

## 2025-03-06 PROCEDURE — 70450 CT HEAD/BRAIN W/O DYE: CPT | Performed by: RADIOLOGY

## 2025-03-06 PROCEDURE — 71045 X-RAY EXAM CHEST 1 VIEW: CPT

## 2025-03-06 PROCEDURE — 80061 LIPID PANEL: CPT | Performed by: STUDENT IN AN ORGANIZED HEALTH CARE EDUCATION/TRAINING PROGRAM

## 2025-03-06 ASSESSMENT — COLUMBIA-SUICIDE SEVERITY RATING SCALE - C-SSRS
6. HAVE YOU EVER DONE ANYTHING, STARTED TO DO ANYTHING, OR PREPARED TO DO ANYTHING TO END YOUR LIFE?: NO
2. HAVE YOU ACTUALLY HAD ANY THOUGHTS OF KILLING YOURSELF?: NO
1. IN THE PAST MONTH, HAVE YOU WISHED YOU WERE DEAD OR WISHED YOU COULD GO TO SLEEP AND NOT WAKE UP?: NO

## 2025-03-06 ASSESSMENT — PAIN SCALES - GENERAL
PAINLEVEL_OUTOF10: 0 - NO PAIN

## 2025-03-06 ASSESSMENT — PAIN - FUNCTIONAL ASSESSMENT
PAIN_FUNCTIONAL_ASSESSMENT: 0-10
PAIN_FUNCTIONAL_ASSESSMENT: 0-10

## 2025-03-07 ENCOUNTER — APPOINTMENT (OUTPATIENT)
Dept: RADIOLOGY | Facility: HOSPITAL | Age: OVER 89
End: 2025-03-07
Payer: MEDICARE

## 2025-03-07 ENCOUNTER — APPOINTMENT (OUTPATIENT)
Dept: CARDIOLOGY | Facility: HOSPITAL | Age: OVER 89
End: 2025-03-07
Payer: MEDICARE

## 2025-03-07 PROBLEM — G45.3 AMAUROSIS FUGAX OF RIGHT EYE: Status: ACTIVE | Noted: 2025-03-07

## 2025-03-07 LAB
AORTIC VALVE PEAK VELOCITY: 0.88 M/S
AV PEAK GRADIENT: 3 MMHG
AVA (PEAK VEL): 2.57 CM2
BNP SERPL-MCNC: 331 PG/ML (ref 0–99)
CHOLEST SERPL-MCNC: 157 MG/DL (ref 0–199)
CHOLEST/HDLC SERPL: 3 {RATIO}
EJECTION FRACTION APICAL 4 CHAMBER: 59.7
EJECTION FRACTION: 63 %
EST. AVERAGE GLUCOSE BLD GHB EST-MCNC: 103 MG/DL
HBA1C MFR BLD: 5.2 %
HDLC SERPL-MCNC: 52.8 MG/DL
HOLD SPECIMEN: NORMAL
HOLD SPECIMEN: NORMAL
LDLC SERPL CALC-MCNC: 88 MG/DL
LEFT ATRIUM VOLUME AREA LENGTH INDEX BSA: 65.3 ML/M2
LEFT VENTRICLE INTERNAL DIMENSION DIASTOLE: 5.37 CM (ref 3.5–6)
LEFT VENTRICULAR OUTFLOW TRACT DIAMETER: 2.09 CM
LV EJECTION FRACTION BIPLANE: 59 %
NON HDL CHOLESTEROL: 104 MG/DL (ref 0–149)
Q ONSET: 213 MS
QRS COUNT: 9 BEATS
QRS DURATION: 96 MS
QT INTERVAL: 436 MS
QTC CALCULATION(BAZETT): 417 MS
QTC FREDERICIA: 423 MS
R AXIS: 15 DEGREES
RIGHT VENTRICLE FREE WALL PEAK S': 10.11 CM/S
RIGHT VENTRICLE PEAK SYSTOLIC PRESSURE: 32.1 MMHG
T AXIS: 68 DEGREES
T OFFSET: 431 MS
TRICUSPID ANNULAR PLANE SYSTOLIC EXCURSION: 2.6 CM
TRIGL SERPL-MCNC: 81 MG/DL (ref 0–149)
VENTRICULAR RATE: 55 BPM
VLDL: 16 MG/DL (ref 0–40)

## 2025-03-07 PROCEDURE — 93306 TTE W/DOPPLER COMPLETE: CPT | Performed by: INTERNAL MEDICINE

## 2025-03-07 PROCEDURE — 70496 CT ANGIOGRAPHY HEAD: CPT

## 2025-03-07 PROCEDURE — G0378 HOSPITAL OBSERVATION PER HR: HCPCS

## 2025-03-07 PROCEDURE — 96360 HYDRATION IV INFUSION INIT: CPT | Mod: 59

## 2025-03-07 PROCEDURE — 70551 MRI BRAIN STEM W/O DYE: CPT

## 2025-03-07 PROCEDURE — 70498 CT ANGIOGRAPHY NECK: CPT

## 2025-03-07 PROCEDURE — 96361 HYDRATE IV INFUSION ADD-ON: CPT

## 2025-03-07 PROCEDURE — 36415 COLL VENOUS BLD VENIPUNCTURE: CPT | Performed by: STUDENT IN AN ORGANIZED HEALTH CARE EDUCATION/TRAINING PROGRAM

## 2025-03-07 PROCEDURE — 99222 1ST HOSP IP/OBS MODERATE 55: CPT | Performed by: INTERNAL MEDICINE

## 2025-03-07 PROCEDURE — 93306 TTE W/DOPPLER COMPLETE: CPT

## 2025-03-07 PROCEDURE — 2500000001 HC RX 250 WO HCPCS SELF ADMINISTERED DRUGS (ALT 637 FOR MEDICARE OP): Performed by: PHYSICIAN ASSISTANT

## 2025-03-07 PROCEDURE — 2550000001 HC RX 255 CONTRASTS: Performed by: STUDENT IN AN ORGANIZED HEALTH CARE EDUCATION/TRAINING PROGRAM

## 2025-03-07 PROCEDURE — 70551 MRI BRAIN STEM W/O DYE: CPT | Performed by: RADIOLOGY

## 2025-03-07 PROCEDURE — 70498 CT ANGIOGRAPHY NECK: CPT | Performed by: RADIOLOGY

## 2025-03-07 PROCEDURE — 70496 CT ANGIOGRAPHY HEAD: CPT | Performed by: RADIOLOGY

## 2025-03-07 PROCEDURE — 2500000004 HC RX 250 GENERAL PHARMACY W/ HCPCS (ALT 636 FOR OP/ED): Performed by: INTERNAL MEDICINE

## 2025-03-07 PROCEDURE — 99223 1ST HOSP IP/OBS HIGH 75: CPT | Performed by: STUDENT IN AN ORGANIZED HEALTH CARE EDUCATION/TRAINING PROGRAM

## 2025-03-07 PROCEDURE — 2500000001 HC RX 250 WO HCPCS SELF ADMINISTERED DRUGS (ALT 637 FOR MEDICARE OP): Performed by: INTERNAL MEDICINE

## 2025-03-07 PROCEDURE — 83036 HEMOGLOBIN GLYCOSYLATED A1C: CPT | Mod: WESLAB | Performed by: STUDENT IN AN ORGANIZED HEALTH CARE EDUCATION/TRAINING PROGRAM

## 2025-03-07 RX ORDER — KETOCONAZOLE 20 MG/G
1 CREAM TOPICAL 2 TIMES DAILY PRN
COMMUNITY

## 2025-03-07 RX ORDER — METOPROLOL TARTRATE 25 MG/1
12.5 TABLET, FILM COATED ORAL 2 TIMES DAILY
Status: DISCONTINUED | OUTPATIENT
Start: 2025-03-07 | End: 2025-03-08 | Stop reason: HOSPADM

## 2025-03-07 RX ORDER — PANTOPRAZOLE SODIUM 40 MG/1
40 TABLET, DELAYED RELEASE ORAL
Status: DISCONTINUED | OUTPATIENT
Start: 2025-03-08 | End: 2025-03-08 | Stop reason: HOSPADM

## 2025-03-07 RX ORDER — SODIUM CHLORIDE 9 MG/ML
50 INJECTION, SOLUTION INTRAVENOUS CONTINUOUS
Status: ACTIVE | OUTPATIENT
Start: 2025-03-07 | End: 2025-03-08

## 2025-03-07 RX ORDER — ASPIRIN 81 MG/1
81 TABLET ORAL DAILY
Status: DISCONTINUED | OUTPATIENT
Start: 2025-03-07 | End: 2025-03-08 | Stop reason: HOSPADM

## 2025-03-07 RX ORDER — CALCIUM CARBONATE 200(500)MG
500 TABLET,CHEWABLE ORAL 3 TIMES DAILY
Status: DISCONTINUED | OUTPATIENT
Start: 2025-03-07 | End: 2025-03-08 | Stop reason: HOSPADM

## 2025-03-07 RX ORDER — ATORVASTATIN CALCIUM 40 MG/1
40 TABLET, FILM COATED ORAL NIGHTLY
Status: DISCONTINUED | OUTPATIENT
Start: 2025-03-07 | End: 2025-03-08 | Stop reason: HOSPADM

## 2025-03-07 RX ADMIN — METOPROLOL TARTRATE 12.5 MG: 25 TABLET, FILM COATED ORAL at 08:31

## 2025-03-07 RX ADMIN — APIXABAN 2.5 MG: 2.5 TABLET, FILM COATED ORAL at 21:06

## 2025-03-07 RX ADMIN — ASPIRIN 81 MG: 81 TABLET, DELAYED RELEASE ORAL at 08:30

## 2025-03-07 RX ADMIN — APIXABAN 5 MG: 5 TABLET, FILM COATED ORAL at 00:41

## 2025-03-07 RX ADMIN — SODIUM CHLORIDE 50 ML/HR: 900 INJECTION, SOLUTION INTRAVENOUS at 02:32

## 2025-03-07 RX ADMIN — APIXABAN 2.5 MG: 2.5 TABLET, FILM COATED ORAL at 08:30

## 2025-03-07 RX ADMIN — METOPROLOL TARTRATE 12.5 MG: 25 TABLET, FILM COATED ORAL at 21:06

## 2025-03-07 RX ADMIN — IOHEXOL 75 ML: 350 INJECTION, SOLUTION INTRAVENOUS at 12:11

## 2025-03-07 SDOH — SOCIAL STABILITY: SOCIAL INSECURITY: WITHIN THE LAST YEAR, HAVE YOU BEEN AFRAID OF YOUR PARTNER OR EX-PARTNER?: NO

## 2025-03-07 SDOH — SOCIAL STABILITY: SOCIAL INSECURITY: ABUSE: ADULT

## 2025-03-07 SDOH — SOCIAL STABILITY: SOCIAL INSECURITY: WERE YOU ABLE TO COMPLETE ALL THE BEHAVIORAL HEALTH SCREENINGS?: YES

## 2025-03-07 SDOH — SOCIAL STABILITY: SOCIAL NETWORK
DO YOU BELONG TO ANY CLUBS OR ORGANIZATIONS SUCH AS CHURCH GROUPS, UNIONS, FRATERNAL OR ATHLETIC GROUPS, OR SCHOOL GROUPS?: YES

## 2025-03-07 SDOH — ECONOMIC STABILITY: HOUSING INSECURITY: AT ANY TIME IN THE PAST 12 MONTHS, WERE YOU HOMELESS OR LIVING IN A SHELTER (INCLUDING NOW)?: NO

## 2025-03-07 SDOH — HEALTH STABILITY: MENTAL HEALTH: EXPERIENCED ANY OF THE FOLLOWING LIFE EVENTS: DEATH OF FAMILY/FRIEND

## 2025-03-07 SDOH — SOCIAL STABILITY: SOCIAL NETWORK: IN A TYPICAL WEEK, HOW MANY TIMES DO YOU TALK ON THE PHONE WITH FAMILY, FRIENDS, OR NEIGHBORS?: THREE TIMES A WEEK

## 2025-03-07 SDOH — SOCIAL STABILITY: SOCIAL NETWORK: HOW OFTEN DO YOU ATTEND CHURCH OR RELIGIOUS SERVICES?: NEVER

## 2025-03-07 SDOH — SOCIAL STABILITY: SOCIAL INSECURITY: DO YOU FEEL ANYONE HAS EXPLOITED OR TAKEN ADVANTAGE OF YOU FINANCIALLY OR OF YOUR PERSONAL PROPERTY?: NO

## 2025-03-07 SDOH — SOCIAL STABILITY: SOCIAL INSECURITY: HAS ANYONE EVER THREATENED TO HURT YOUR FAMILY OR YOUR PETS?: NO

## 2025-03-07 SDOH — SOCIAL STABILITY: SOCIAL NETWORK: HOW OFTEN DO YOU ATTEND MEETINGS OF THE CLUBS OR ORGANIZATIONS YOU BELONG TO?: MORE THAN 4 TIMES PER YEAR

## 2025-03-07 SDOH — SOCIAL STABILITY: SOCIAL INSECURITY: DO YOU FEEL UNSAFE GOING BACK TO THE PLACE WHERE YOU ARE LIVING?: NO

## 2025-03-07 SDOH — HEALTH STABILITY: MENTAL HEALTH: HOW OFTEN DO YOU HAVE SIX OR MORE DRINKS ON ONE OCCASION?: NEVER

## 2025-03-07 SDOH — HEALTH STABILITY: MENTAL HEALTH: HOW MANY DRINKS CONTAINING ALCOHOL DO YOU HAVE ON A TYPICAL DAY WHEN YOU ARE DRINKING?: 1 OR 2

## 2025-03-07 SDOH — ECONOMIC STABILITY: INCOME INSECURITY: IN THE PAST 12 MONTHS HAS THE ELECTRIC, GAS, OIL, OR WATER COMPANY THREATENED TO SHUT OFF SERVICES IN YOUR HOME?: NO

## 2025-03-07 SDOH — HEALTH STABILITY: PHYSICAL HEALTH: ON AVERAGE, HOW MANY MINUTES DO YOU ENGAGE IN EXERCISE AT THIS LEVEL?: 40 MIN

## 2025-03-07 SDOH — ECONOMIC STABILITY: HOUSING INSECURITY: IN THE LAST 12 MONTHS, WAS THERE A TIME WHEN YOU WERE NOT ABLE TO PAY THE MORTGAGE OR RENT ON TIME?: NO

## 2025-03-07 SDOH — SOCIAL STABILITY: SOCIAL INSECURITY: ARE YOU MARRIED, WIDOWED, DIVORCED, SEPARATED, NEVER MARRIED, OR LIVING WITH A PARTNER?: WIDOWED

## 2025-03-07 SDOH — HEALTH STABILITY: PHYSICAL HEALTH
HOW OFTEN DO YOU NEED TO HAVE SOMEONE HELP YOU WHEN YOU READ INSTRUCTIONS, PAMPHLETS, OR OTHER WRITTEN MATERIAL FROM YOUR DOCTOR OR PHARMACY?: SOMETIMES

## 2025-03-07 SDOH — HEALTH STABILITY: MENTAL HEALTH: HOW OFTEN DO YOU HAVE A DRINK CONTAINING ALCOHOL?: 2-3 TIMES A WEEK

## 2025-03-07 SDOH — ECONOMIC STABILITY: FOOD INSECURITY: WITHIN THE PAST 12 MONTHS, THE FOOD YOU BOUGHT JUST DIDN'T LAST AND YOU DIDN'T HAVE MONEY TO GET MORE.: NEVER TRUE

## 2025-03-07 SDOH — HEALTH STABILITY: PHYSICAL HEALTH: ON AVERAGE, HOW MANY DAYS PER WEEK DO YOU ENGAGE IN MODERATE TO STRENUOUS EXERCISE (LIKE A BRISK WALK)?: 5 DAYS

## 2025-03-07 SDOH — SOCIAL STABILITY: SOCIAL INSECURITY: DOES ANYONE TRY TO KEEP YOU FROM HAVING/CONTACTING OTHER FRIENDS OR DOING THINGS OUTSIDE YOUR HOME?: NO

## 2025-03-07 SDOH — ECONOMIC STABILITY: FOOD INSECURITY: HOW HARD IS IT FOR YOU TO PAY FOR THE VERY BASICS LIKE FOOD, HOUSING, MEDICAL CARE, AND HEATING?: NOT HARD AT ALL

## 2025-03-07 SDOH — HEALTH STABILITY: MENTAL HEALTH
DO YOU FEEL STRESS - TENSE, RESTLESS, NERVOUS, OR ANXIOUS, OR UNABLE TO SLEEP AT NIGHT BECAUSE YOUR MIND IS TROUBLED ALL THE TIME - THESE DAYS?: NOT AT ALL

## 2025-03-07 SDOH — SOCIAL STABILITY: SOCIAL INSECURITY: WITHIN THE LAST YEAR, HAVE YOU BEEN HUMILIATED OR EMOTIONALLY ABUSED IN OTHER WAYS BY YOUR PARTNER OR EX-PARTNER?: NO

## 2025-03-07 SDOH — ECONOMIC STABILITY: FOOD INSECURITY: WITHIN THE PAST 12 MONTHS, YOU WORRIED THAT YOUR FOOD WOULD RUN OUT BEFORE YOU GOT THE MONEY TO BUY MORE.: NEVER TRUE

## 2025-03-07 SDOH — SOCIAL STABILITY: SOCIAL INSECURITY: ARE YOU OR HAVE YOU BEEN THREATENED OR ABUSED PHYSICALLY, EMOTIONALLY, OR SEXUALLY BY ANYONE?: NO

## 2025-03-07 SDOH — SOCIAL STABILITY: SOCIAL INSECURITY
WITHIN THE LAST YEAR, HAVE YOU BEEN RAPED OR FORCED TO HAVE ANY KIND OF SEXUAL ACTIVITY BY YOUR PARTNER OR EX-PARTNER?: NO

## 2025-03-07 SDOH — ECONOMIC STABILITY: TRANSPORTATION INSECURITY: IN THE PAST 12 MONTHS, HAS LACK OF TRANSPORTATION KEPT YOU FROM MEDICAL APPOINTMENTS OR FROM GETTING MEDICATIONS?: NO

## 2025-03-07 SDOH — SOCIAL STABILITY: SOCIAL INSECURITY: ARE THERE ANY APPARENT SIGNS OF INJURIES/BEHAVIORS THAT COULD BE RELATED TO ABUSE/NEGLECT?: NO

## 2025-03-07 SDOH — SOCIAL STABILITY: SOCIAL INSECURITY: HAVE YOU HAD THOUGHTS OF HARMING ANYONE ELSE?: NO

## 2025-03-07 SDOH — ECONOMIC STABILITY: HOUSING INSECURITY: IN THE PAST 12 MONTHS, HOW MANY TIMES HAVE YOU MOVED WHERE YOU WERE LIVING?: 0

## 2025-03-07 SDOH — HEALTH STABILITY: PHYSICAL HEALTH
ON AVERAGE, HOW MANY DAYS PER WEEK DO YOU ENGAGE IN MODERATE TO STRENUOUS EXERCISE (LIKE A BRISK WALK)?: PATIENT DECLINED

## 2025-03-07 SDOH — SOCIAL STABILITY: SOCIAL INSECURITY: HAVE YOU HAD ANY THOUGHTS OF HARMING ANYONE ELSE?: NO

## 2025-03-07 SDOH — SOCIAL STABILITY: SOCIAL NETWORK: HOW OFTEN DO YOU GET TOGETHER WITH FRIENDS OR RELATIVES?: TWICE A WEEK

## 2025-03-07 SDOH — HEALTH STABILITY: PHYSICAL HEALTH: ON AVERAGE, HOW MANY MINUTES DO YOU ENGAGE IN EXERCISE AT THIS LEVEL?: PATIENT DECLINED

## 2025-03-07 ASSESSMENT — ENCOUNTER SYMPTOMS
CHOKING: 0
TROUBLE SWALLOWING: 0
NAUSEA: 0
FATIGUE: 0
STRIDOR: 0
NECK STIFFNESS: 0
SLEEP DISTURBANCE: 0
WEAKNESS: 0
AGITATION: 0
VOICE CHANGE: 0
DYSPHORIC MOOD: 0
COLOR CHANGE: 0
ARTHRALGIAS: 0
HEMATURIA: 0
FEVER: 0
UNEXPECTED WEIGHT CHANGE: 0
ACTIVITY CHANGE: 0
SHORTNESS OF BREATH: 0
VOMITING: 0
SPEECH DIFFICULTY: 0
ABDOMINAL PAIN: 0
DIAPHORESIS: 0
ABDOMINAL DISTENTION: 0
BACK PAIN: 0
SORE THROAT: 0
DIARRHEA: 0
RHINORRHEA: 0
HALLUCINATIONS: 0
ADENOPATHY: 0
CONFUSION: 0
TREMORS: 0
PALPITATIONS: 0
SINUS PAIN: 0
DIZZINESS: 0
DECREASED CONCENTRATION: 0
POLYDIPSIA: 0
RECTAL PAIN: 0
FACIAL ASYMMETRY: 0
CHEST TIGHTNESS: 0
EYE ITCHING: 0
PHOTOPHOBIA: 0
EYE REDNESS: 0
EYE DISCHARGE: 0
APNEA: 0
SEIZURES: 0
CONSTIPATION: 0
LIGHT-HEADEDNESS: 0
SINUS PRESSURE: 0
ANAL BLEEDING: 0
FLANK PAIN: 0
DIFFICULTY URINATING: 0
DYSURIA: 0
APPETITE CHANGE: 0
JOINT SWELLING: 0
BRUISES/BLEEDS EASILY: 0
COUGH: 0
CHILLS: 0
HEADACHES: 0
BLOOD IN STOOL: 0
NUMBNESS: 0
WOUND: 0
NERVOUS/ANXIOUS: 0
FREQUENCY: 0
HYPERACTIVE: 0
POLYPHAGIA: 0
FACIAL SWELLING: 0
NECK PAIN: 0
WHEEZING: 0
MYALGIAS: 0
EYE PAIN: 0

## 2025-03-07 ASSESSMENT — COGNITIVE AND FUNCTIONAL STATUS - GENERAL
EATING MEALS: A LITTLE
DRESSING REGULAR LOWER BODY CLOTHING: A LITTLE
HELP NEEDED FOR BATHING: A LITTLE
PERSONAL GROOMING: A LITTLE
TOILETING: A LITTLE
MOVING TO AND FROM BED TO CHAIR: A LITTLE
DAILY ACTIVITIY SCORE: 24
CLIMB 3 TO 5 STEPS WITH RAILING: A LITTLE
MOVING FROM LYING ON BACK TO SITTING ON SIDE OF FLAT BED WITH BEDRAILS: A LITTLE
MOBILITY SCORE: 22
WALKING IN HOSPITAL ROOM: A LITTLE
MOBILITY SCORE: 18
DAILY ACTIVITIY SCORE: 18
DRESSING REGULAR UPPER BODY CLOTHING: A LITTLE
WALKING IN HOSPITAL ROOM: A LITTLE
TURNING FROM BACK TO SIDE WHILE IN FLAT BAD: A LITTLE
PATIENT BASELINE BEDBOUND: NO
CLIMB 3 TO 5 STEPS WITH RAILING: A LITTLE
STANDING UP FROM CHAIR USING ARMS: A LITTLE

## 2025-03-07 ASSESSMENT — LIFESTYLE VARIABLES
AUDIT TOTAL SCORE: 3
HOW OFTEN DURING THE LAST YEAR HAVE YOU FOUND THAT YOU WERE NOT ABLE TO STOP DRINKING ONCE YOU HAD STARTED: NEVER
HOW OFTEN DURING THE LAST YEAR HAVE YOU BEEN UNABLE TO REMEMBER WHAT HAPPENED THE NIGHT BEFORE BECAUSE YOU HAD BEEN DRINKING: NEVER
HOW OFTEN DO YOU HAVE 6 OR MORE DRINKS ON ONE OCCASION: NEVER
HAS A RELATIVE, FRIEND, DOCTOR, OR ANOTHER HEALTH PROFESSIONAL EXPRESSED CONCERN ABOUT YOUR DRINKING OR SUGGESTED YOU CUT DOWN: NO
HOW OFTEN DURING THE LAST YEAR HAVE YOU NEEDED AN ALCOHOLIC DRINK FIRST THING IN THE MORNING TO GET YOURSELF GOING AFTER A NIGHT OF HEAVY DRINKING: NEVER
SKIP TO QUESTIONS 9-10: 1
PRESCIPTION_ABUSE_PAST_12_MONTHS: YES
HOW OFTEN DURING THE LAST YEAR HAVE YOU HAD A FEELING OF GUILT OR REMORSE AFTER DRINKING: NEVER
HOW MANY STANDARD DRINKS CONTAINING ALCOHOL DO YOU HAVE ON A TYPICAL DAY: 1 OR 2
AUDIT-C TOTAL SCORE: 3
SUBSTANCE_ABUSE_PAST_12_MONTHS: NO
SUBSTANCE_ABUSE_PAST_12_MONTHS: NO
SKIP TO QUESTIONS 9-10: 1
HOW OFTEN DO YOU HAVE A DRINK CONTAINING ALCOHOL: 2-3 TIMES A WEEK
PRESCIPTION_ABUSE_PAST_12_MONTHS: YES
AUDIT TOTAL SCORE: 0
AUDIT-C TOTAL SCORE: 3
HAVE YOU OR SOMEONE ELSE BEEN INJURED AS A RESULT OF YOUR DRINKING: NO
HOW OFTEN DURING THE LAST YEAR HAVE YOU FAILED TO DO WHAT WAS NORMALLY EXPECTED FROM YOU BECAUSE OF DRINKING: NEVER
AUDIT-C TOTAL SCORE: 3

## 2025-03-07 ASSESSMENT — ACTIVITIES OF DAILY LIVING (ADL)
PATIENT'S MEMORY ADEQUATE TO SAFELY COMPLETE DAILY ACTIVITIES?: YES
LACK_OF_TRANSPORTATION: NO
DRESSING YOURSELF: INDEPENDENT
WALKS IN HOME: NEEDS ASSISTANCE
BATHING: INDEPENDENT
JUDGMENT_ADEQUATE_SAFELY_COMPLETE_DAILY_ACTIVITIES: YES
LACK_OF_TRANSPORTATION: NO
TOILETING: NEEDS ASSISTANCE
LACK_OF_TRANSPORTATION: NO
HEARING - LEFT EAR: HEARING AID
FEEDING YOURSELF: INDEPENDENT
GROOMING: INDEPENDENT
ADEQUATE_TO_COMPLETE_ADL: YES
HEARING - RIGHT EAR: HEARING AID

## 2025-03-07 ASSESSMENT — PAIN SCALES - GENERAL
PAINLEVEL_OUTOF10: 0 - NO PAIN

## 2025-03-07 ASSESSMENT — PATIENT HEALTH QUESTIONNAIRE - PHQ9
2. FEELING DOWN, DEPRESSED OR HOPELESS: NOT AT ALL
1. LITTLE INTEREST OR PLEASURE IN DOING THINGS: NOT AT ALL
1. LITTLE INTEREST OR PLEASURE IN DOING THINGS: NOT AT ALL
2. FEELING DOWN, DEPRESSED OR HOPELESS: NOT AT ALL
SUM OF ALL RESPONSES TO PHQ9 QUESTIONS 1 & 2: 0
SUM OF ALL RESPONSES TO PHQ9 QUESTIONS 1 & 2: 0

## 2025-03-07 ASSESSMENT — PAIN - FUNCTIONAL ASSESSMENT
PAIN_FUNCTIONAL_ASSESSMENT: 0-10

## 2025-03-07 NOTE — CARE PLAN
Problem: Pain - Adult  Goal: Verbalizes/displays adequate comfort level or baseline comfort level  Outcome: Progressing     Problem: Safety - Adult  Goal: Free from fall injury  Outcome: Progressing     Problem: Discharge Planning  Goal: Discharge to home or other facility with appropriate resources  Outcome: Progressing     Problem: Chronic Conditions and Co-morbidities  Goal: Patient's chronic conditions and co-morbidity symptoms are monitored and maintained or improved  Outcome: Progressing     Problem: Nutrition  Goal: Nutrient intake appropriate for maintaining nutritional needs  Outcome: Progressing   The patient's goals for the shift include      The clinical goals for the shift include monitor vs, labs, I&O's; manage pain; monitor neuro function; skin safety; rest

## 2025-03-07 NOTE — PROGRESS NOTES
03/07/25 1427   Physical Activity   On average, how many days per week do you engage in moderate to strenuous exercise (like a brisk walk)? Pt Declined   On average, how many minutes do you engage in exercise at this level? Pt Declined   Financial Resource Strain   How hard is it for you to pay for the very basics like food, housing, medical care, and heating? Not hard   Housing Stability   In the last 12 months, was there a time when you were not able to pay the mortgage or rent on time? N   In the past 12 months, how many times have you moved where you were living? 0   At any time in the past 12 months, were you homeless or living in a shelter (including now)? N   Transportation Needs   In the past 12 months, has lack of transportation kept you from medical appointments or from getting medications? no   In the past 12 months, has lack of transportation kept you from meetings, work, or from getting things needed for daily living? No   Food Insecurity   Within the past 12 months, you worried that your food would run out before you got the money to buy more. Never true   Within the past 12 months, the food you bought just didn't last and you didn't have money to get more. Never true   Stress   Do you feel stress - tense, restless, nervous, or anxious, or unable to sleep at night because your mind is troubled all the time - these days? Not at all   Social Connections   In a typical week, how many times do you talk on the phone with family, friends, or neighbors? Three   How often do you get together with friends or relatives? Twice   How often do you attend Congregational or Rastafarian services? Never   Do you belong to any clubs or organizations such as Congregational groups, unions, fraternal or athletic groups, or school groups? Yes   How often do you attend meetings of the clubs or organizations you belong to? More than 4   Are you , , , , never , or living with a partner?     Intimate Partner Violence   Within the last year, have you been afraid of your partner or ex-partner? No   Within the last year, have you been humiliated or emotionally abused in other ways by your partner or ex-partner? No   Within the last year, have you been kicked, hit, slapped, or otherwise physically hurt by your partner or ex-partner? No   Within the last year, have you been raped or forced to have any kind of sexual activity by your partner or ex-partner? No   Alcohol Use   Q1: How often do you have a drink containing alcohol? 2-3 per wk   Q2: How many drinks containing alcohol do you have on a typical day when you are drinking? 1 or 2   Q3: How often do you have six or more drinks on one occasion? Never   Utilities   In the past 12 months has the electric, gas, oil, or water company threatened to shut off services in your home? No   Health Literacy   How often do you need to have someone help you when you read instructions, pamphlets, or other written material from your doctor or pharmacy? Sometimes

## 2025-03-07 NOTE — CONSULTS
Neurology Consult    History Of Present Illness:  Mr. Whitmore is a 89 y.o.  male admitted 3/6/2025 LOS day 0, consulted for TIA.    suddenly lost vision in his left eye.  He states that lasted roughly 5 to 10 minutes however resolved by the time the EMS arrived.     Yesterday evening he was watching a movie around 630 or 7 PM when he suddenly noticed dimming of the left eye and then a curtain coming down to complete blackness.  This lasted 10 to 15 minutes.  At which point the vision returned in a curtainlike fashion.  He is then brought by EMS to the emergency department for further evaluation.  He denies all other neurological symptoms.    Well sitting in the emergency room this morning he had another brief minutes or so episode of squiggles in his left eye which then resolved spontaneously.  He is currently normal      Reviewed relevant results, independent review of imaging:   CT head: no acute process, R parietal encephalomalacia   MRI Brain: No acute infarct, chronic right parietal infarct and scattered temporal and cerebellar infarcts, there is a new interval lacunar type infarct within the right semiovale.   Carotid US: Pending  TTE: Pending  Labs:  ,     Past Medical History  prior stroke, TIAs, atrial fibrillation     Patient Active Problem List   Diagnosis    Atrial fibrillation (Multi)    Essential hypertension    Malignant tumor of prostate (Multi)    Peripheral edema    Acute CVA (cerebrovascular accident) (Multi)    TIA (transient ischemic attack)     Surgical History  Past Surgical History:   Procedure Laterality Date    MR HEAD ANGIO WO IV CONTRAST  12/21/2017    MR HEAD ANGIO WO IV CONTRAST [facility]     Social History  Social History     Tobacco Use    Smoking status: Former     Types: Cigarettes    Smokeless tobacco: Never   Vaping Use    Vaping status: Never Used   Substance Use Topics    Alcohol use: Yes     Alcohol/week: 2.0 standard drinks of alcohol     Types: 2 Glasses of  "wine per week    Drug use: Never     Meds and Allergies  Reviewed in EMR  Current Outpatient Medications   Medication Instructions    acetaminophen (TYLENOL) 650 mg, Every 4 hours PRN    apixaban (ELIQUIS) 2.5 mg, 2 times daily    fish oil concentrate (Omega-3) 120-180 mg capsule 1 capsule, Daily    metoprolol tartrate (Lopressor) 25 mg tablet 0.5 tablets, 2 times daily    metroNIDAZOLE (Metrogel) 0.75 % gel 1 Application, 2 times daily       Objective:  Blood pressure 132/76, pulse 56, temperature 36.5 °C (97.7 °F), temperature source Oral, resp. rate 14, height 1.83 m (6' 0.05\"), weight 72.6 kg (160 lb), SpO2 96%.    Physical Exam:  Neurological Exam:  General: NAD, cooperative   Neuro:  Awake alert, language normal, no dysarthria    Visual fields full  EOM full range, mallika 3-4 mm, no nystagmus   Facial strength normal and symmetric   Tongue midline   Strength 5/5 throughout   Tone normal  Bulk normal   Reflexes:      R          L  BR:               2          2  Biceps:         2          2  Triceps:        2          2  Knee:           2          2  Ankle:          2          2  Toes down going   Sensory: normal  No Tremors     Reviewed relevant results, independent review of imaging:   No results found for this or any previous visit (from the past 4464 hours).         BNP   Date/Time Value Ref Range Status   03/06/2025 08:25  (H) 0 - 99 pg/mL Final     HDL-Cholesterol   Date Value Ref Range Status   03/06/2025 52.8 mg/dL Final     Comment:       Age       Very Low   Low     Normal    High    0-19 Y    < 35      < 40     40-45     ----  20-24 Y    ----     < 40      >45      ----        >24 Y      ----     < 40     40-60      >60       Cholesterol/HDL Ratio   Date Value Ref Range Status   03/06/2025 3.0  Final     Comment:       Ref Values  Desirable  < 3.4  High Risk  > 5.0     VLDL   Date Value Ref Range Status   03/06/2025 16 0 - 40 mg/dL Final     Triglycerides   Date Value Ref Range Status "   03/06/2025 81 0 - 149 mg/dL Final     Comment:     Age              Desirable        Borderline         High        Very High  SEX:B           mg/dL             mg/dL               mg/dL      mg/dL  <=14D                       ----               ----        ----  15D-365D                    ----               ----        ----  1Y-9Y           0-74               75-99             >=100       ----  10Y-19Y        0-89                            >=130       ----  20Y-24Y        0-114             115-149             >=150      ----  >= 25Y         0-149             150-199             200-499    >=500      Venipuncture immediately after or during the administration of Metamizole may lead to falsely low results. Testing should be performed immediately prior to Metamizole dosing.     Non HDL Cholesterol   Date Value Ref Range Status   03/06/2025 104 0 - 149 mg/dL Final     Comment:           Age       Desirable   Borderline High   High     Very High     0-19 Y     0 - 119       120 - 144     >/= 145    >/= 160    20-24 Y     0 - 149       150 - 189     >/= 190      ----         >24 Y    30 mg/dL above LDL Cholesterol goal       Creatinine   Date Value Ref Range Status   03/06/2025 0.93 0.50 - 1.30 mg/dL Final     eGFR   Date Value Ref Range Status   03/06/2025 77 >60 mL/min/1.73m*2 Final     Comment:     Calculations of estimated GFR are performed using the 2021 CKD-EPI Study Refit equation without the race variable for the IDMS-Traceable creatinine methods.  https://jasn.asnjournals.org/content/early/2021/09/22/ASN.2717338538          Assessment:   Mr. Whitmore is a 89 y.o. male with multiple prior small ischemic strokes and atrial fibrillation on Eliquis presenting with left eye amaurosis fugax for 15 minutes which has since resolved.  He had another brief episode of obscuration lasting  minutes which is also resolved.  He is now normal. MRI Brain shows no acute infarct, chronic right parietal  infarct and scattered temporal and cerebellar infarcts, there is a new interval lacunar type infarct within the right semiovale.  This may suggest some strokes occurring while on Eliquis.    Vessel imaging and echocardiogram is still pending but will be important for determining if there is an underlying carotid lesion or intracardiac pathology that needs addressed.    Recommendations:  Continue with Eliquis  And aspirin 81 mg to current regimen  High intensity statin Lipitor 40 mg daily  CTA head and neck ordered  Read of echocardiogram pending  He will need ophthalmology follow-up, [facility]  He will need neurology follow-up preferably with vascular neurology      Medical decision making was high complexity.  The patient has an acute neurologic disorder with threat to bodily function or life, and I independently interpreted his neuroimaging.    Mikey Lynne MD  Neurology and Neuromuscular Medicine   [facility]  [facility] [place]

## 2025-03-07 NOTE — ED PROVIDER NOTES
Patient was seen by both myself and advanced practitioner.  I personally saw the patient and made/approved the management plan and take responsibility for the patient management     Patient is a 89-year-old male with a history of prior CVA, TIAs, atrial fibrillation that presents emergency room for evaluation of transient vision loss in his left eye.  Patient states he was at a friend's house watching a movie when he suddenly lost vision in his left eye.  He states that lasted roughly 5 to 10 minutes however resolved by the time the EMS arrived.  He states he no longer has any vision change and denies any slurred speech, facial droop, unilateral weakness at that time.  Patient states is similar to previous TIA.  He does note that he is on Eliquis and did take his dose this morning.    On exam patient resting comfortably and in no obvious distress.  He is awake, alert and oriented.  He is moving all extremities equally with no obvious focal motor deficit.  Extraocular eye movements are intact and pupils equal, round, reactive to light.  Cranials 2 through 12 are intact.  As symptoms have resolved for the patient at this time and he has an NIH stroke score 0 no code brain attack was called.  CT scan of the brain was ordered along blood work including CBC, CMP, troponin.  EKG showed atrial fibrillation with slow ventricular response but no evidence of STEMI.  Blood work was unremarkable including normal electrolytes, normal kidney function.  He did have normal troponin of 8 and I have very low suspicion for ACS as source of patient's symptoms.  Chest x-ray was clear showing no evidence of pneumonia, pneumothorax or wide mediastinum.  CT scan of the brain shows no acute process.  Given patient's concerning symptoms for possible TIA patient will be admitted for further evaluation.    I independently interpreted the following study(s) EKG, CT scan of the head.  EKG shows atrial fibrillation with a slow ventricular  response with a rate of 55 bpm, normal axis, QTc normal at 417, no evidence of STEMI.  CT scan of the brain shows no evidence of intracranial hemorrhage or acute stroke but does show chronic changes.       Woody Urbina,   03/06/25 8957

## 2025-03-07 NOTE — PROGRESS NOTES
Pharmacy Medication History Review    Alvin Whitmore is a 89 y.o. male admitted for TIA (transient ischemic attack). Pharmacy reviewed the patient's [id number] medications and allergies for accuracy.    Medications ADDED:  Lubricating eye drop  Chondroitin/Glucosamine oral  Ketoconozole 2% cream  Medications CHANGED:  Eliquis 5mg Patient unclear on 2.5 or 5mg tablets - patient repots only cutting metoprolol in half for daily doses - [facility] 5mg [facility] BID  Medications REMOVED:   Acetaminophen 325    The list below reflects the updated PTA list. Comments regarding how patient may be taking medications differently can be found in the Admit Orders Activity  Prior to Admission Medications   Prescriptions Last Dose Informant   apixaban (Eliquis) 5 mg tablet 3/6/2025 Self   Sig: Take 0.5 tablets (2.5 mg) by mouth 2 times a day.   fish oil concentrate (Omega-3) 120-180 mg capsule 3/6/2025 Self   Sig: Take 1 capsule (1 g) by mouth once daily.   glucosamine HCl/chondroitin ojeda (GLUCOSAMINE-CHONDROITIN ORAL) 3/6/2025 Self   Sig: Take 1 tablet by mouth 2 times a day.   ketoconazole (NIZOral) 2 % cream 3/6/2025 Self   Sig: Apply 1 Application topically 2 times a day as needed for itching. GROIN   lubricating eye drops ophthalmic solution Unknown Self   Sig: Administer 1 drop into both eyes if needed for dry eyes.   metoprolol tartrate (Lopressor) 25 mg tablet 3/6/2025 Self   Sig: Take 0.5 tablets (12.5 mg) by mouth 2 times a day. With food   metroNIDAZOLE (Metrogel) 0.75 % gel Unknown Self   Si Application 2 times a day. prn      Facility-Administered Medications: None        The list below reflects the updated allergy list. Please review each documented allergy for additional clarification and justification.  Allergies  Reviewed by Shireen Long CPhT on 3/7/2025   No Known Allergies         Pharmacy has been updated to [facility].    Sources used to complete the med history include dispense  history, PTA medication list, [facility] historical progress notes, patient interview. Patient is a fair historian.    Below are additional concerns with the patient's PTA list.  Patient unclear on 2.5 or 5mg tablets (5MG 1/2 TABLET BID WAS ON PTA MEDICATION LIST FROM HISTORICAL)- patient repots only cutting metoprolol in half for daily doses - [facility] indicates 5mg tablet BID --- patient also reports needing a shower daily or BID due to yeast issues in the groin    Shireen Long, Roderick-Adv  Please reach out via [employer] Secure Chat for questions

## 2025-03-07 NOTE — H&P
History Of Present Illness  Alvin Whitmore is a 89 y.o. male presenting with loss of vision in the left eye while watching TV.  He felt curtain coming down to complete blackness patient has history of this in the past and was diagnosed with TIAs.  He also has history of lacunar infarcts.  By the time EMS came his symptoms had resolved with only lasted for 5 minutes.  He had another episode of flashing lights in the left eye while in the ER lasting for few minutes.  He is compliant with the medication and did not miss his Eliquis     Past Medical History  Past Medical History:   Diagnosis Date    Atrial fibrillation (Multi)     Cancer (Multi)     Hypertension     TIA (transient ischemic attack) 01/01/1996   History of prostate cancer with seed     Surgical History  Past Surgical History:   Procedure Laterality Date    MR HEAD ANGIO WO IV CONTRAST  12/21/2017    MR HEAD ANGIO WO IV CONTRAST [facility]        Social History  He reports that he has quit smoking. His smoking use included cigarettes. He has never used smokeless tobacco. He reports current alcohol use of about 2.0 standard drinks of alcohol per week. He reports that he does not use drugs.    Family History  Family History   Problem Relation Name Age of Onset    No Known Problems Mother      No Known Problems Father          Allergies  Patient has no known allergies.    Review of Systems   Constitutional:  Negative for activity change, appetite change, chills, diaphoresis, fatigue, fever and unexpected weight change.   HENT:  Negative for congestion, dental problem, drooling, ear discharge, ear pain, facial swelling, hearing loss, mouth sores, nosebleeds, postnasal drip, rhinorrhea, sinus pressure, sinus pain, sneezing, sore throat, tinnitus, trouble swallowing and voice change.    Eyes:  Positive for visual disturbance. Negative for photophobia, pain, discharge, redness and itching.   Respiratory:  Negative for apnea, cough, choking, chest  tightness, shortness of breath, wheezing and stridor.    Cardiovascular:  Negative for chest pain, palpitations and leg swelling.   Gastrointestinal:  Negative for abdominal distention, abdominal pain, anal bleeding, blood in stool, constipation, diarrhea, nausea, rectal pain and vomiting.   Endocrine: Negative for cold intolerance, heat intolerance, polydipsia, polyphagia and polyuria.   Genitourinary:  Negative for decreased urine volume, difficulty urinating, dysuria, enuresis, flank pain, frequency, genital sores, hematuria and urgency.   Musculoskeletal:  Negative for arthralgias, back pain, gait problem, joint swelling, myalgias, neck pain and neck stiffness.   Skin:  Negative for color change, pallor, rash and wound.   Allergic/Immunologic: Negative for environmental allergies, food allergies and immunocompromised state.   Neurological:  Negative for dizziness, tremors, seizures, syncope, facial asymmetry, speech difficulty, weakness, light-headedness, numbness and headaches.   Hematological:  Negative for adenopathy. Does not bruise/bleed easily.   Psychiatric/Behavioral:  Negative for agitation, behavioral problems, confusion, decreased concentration, dysphoric mood, hallucinations, self-injury, sleep disturbance and suicidal ideas. The patient is not nervous/anxious and is not hyperactive.         Physical Exam  Vitals reviewed.   Constitutional:       Appearance: Normal appearance.   HENT:      Head: Normocephalic and atraumatic.      Right Ear: Tympanic membrane, ear canal and external ear normal.      Left Ear: Tympanic membrane, ear canal and external ear normal.      Nose: Nose normal.      Mouth/Throat:      Pharynx: Oropharynx is clear.   Eyes:      Extraocular Movements: Extraocular movements intact.      Conjunctiva/sclera: Conjunctivae normal.      Pupils: Pupils are equal, round, and reactive to light.   Cardiovascular:      Rate and Rhythm: Normal rate. Rhythm irregular.      Pulses: Normal  "pulses.      Heart sounds: Normal heart sounds.   Pulmonary:      Effort: Pulmonary effort is normal.      Breath sounds: Normal breath sounds.   Abdominal:      General: Abdomen is flat. Bowel sounds are normal.      Palpations: Abdomen is soft.   Musculoskeletal:      Cervical back: Normal range of motion and neck supple.   Skin:     General: Skin is warm and dry.   Neurological:      General: No focal deficit present.      Mental Status: He is alert and oriented to person, place, and time.   Psychiatric:         Mood and Affect: Mood normal.          Last Recorded Vitals  Blood pressure 110/67, pulse 69, temperature 36.5 °C (97.7 °F), temperature source Oral, resp. rate 14, height 1.83 m (6' 0.05\"), weight 72.6 kg (160 lb), SpO2 97%.    Relevant Results        Scheduled medications  apixaban, 2.5 mg, oral, BID  aspirin, 81 mg, oral, Daily  atorvastatin, 40 mg, oral, Nightly  metoprolol tartrate, 12.5 mg, oral, BID  perflutren lipid microspheres, 0.5-10 mL of dilution, intravenous, Once in imaging  perflutren protein A microsphere, 0.5 mL, intravenous, Once in imaging  sulfur hexafluoride microsphr, 2 mL, intravenous, Once in imaging      Continuous medications  sodium chloride 0.9%, 50 mL/hr, Last Rate: 50 mL/hr (03/07/25 0700)      PRN medications    Results for orders placed or performed during the hospital encounter of 03/06/25 (from the past 24 hours)   CBC and Auto Differential   Result Value Ref Range    WBC 6.1 4.4 - 11.3 x10*3/uL    nRBC 0.0 0.0 - 0.0 /100 WBCs    RBC 3.83 (L) 4.50 - 5.90 x10*6/uL    Hemoglobin 12.1 (L) 13.5 - 17.5 g/dL    Hematocrit 36.6 (L) 41.0 - 52.0 %    MCV 96 80 - 100 fL    MCH 31.6 26.0 - 34.0 pg    MCHC 33.1 32.0 - 36.0 g/dL    RDW 13.8 11.5 - 14.5 %    Platelets 189 150 - 450 x10*3/uL    Neutrophils % 56.6 40.0 - 80.0 %    Immature Granulocytes %, Automated 0.2 0.0 - 0.9 %    Lymphocytes % 25.8 13.0 - 44.0 %    Monocytes % 11.4 2.0 - 10.0 %    Eosinophils % 4.4 0.0 - 6.0 %    " Basophils % 1.6 0.0 - 2.0 %    Neutrophils Absolute 3.47 1.60 - 5.50 x10*3/uL    Immature Granulocytes Absolute, Automated 0.01 0.00 - 0.50 x10*3/uL    Lymphocytes Absolute 1.58 0.80 - 3.00 x10*3/uL    Monocytes Absolute 0.70 0.05 - 0.80 x10*3/uL    Eosinophils Absolute 0.27 0.00 - 0.40 x10*3/uL    Basophils Absolute 0.10 0.00 - 0.10 x10*3/uL   Comprehensive Metabolic Panel   Result Value Ref Range    Glucose 121 (H) 74 - 99 mg/dL    Sodium 138 136 - 145 mmol/L    Potassium 4.7 3.5 - 5.3 mmol/L    Chloride 104 98 - 107 mmol/L    Bicarbonate 28 21 - 32 mmol/L    Anion Gap 11 10 - 20 mmol/L    Urea Nitrogen 24 (H) 6 - 23 mg/dL    Creatinine 0.93 0.50 - 1.30 mg/dL    eGFR 77 >60 mL/min/1.73m*2    Calcium 9.0 8.6 - 10.3 mg/dL    Albumin 3.7 3.4 - 5.0 g/dL    Alkaline Phosphatase 43 33 - 136 U/L    Total Protein 6.3 (L) 6.4 - 8.2 g/dL    AST 26 9 - 39 U/L    Bilirubin, Total 1.1 0.0 - 1.2 mg/dL    ALT 12 10 - 52 U/L   Troponin I, High Sensitivity, Initial   Result Value Ref Range    Troponin I, High Sensitivity 8 0 - 20 ng/L   B-Type Natriuretic Peptide   Result Value Ref Range     (H) 0 - 99 pg/mL   Lipid Panel   Result Value Ref Range    Cholesterol 157 0 - 199 mg/dL    HDL-Cholesterol 52.8 mg/dL    Cholesterol/HDL Ratio 3.0     LDL Calculated 88 <=99 mg/dL    VLDL 16 0 - 40 mg/dL    Triglycerides 81 0 - 149 mg/dL    Non HDL Cholesterol 104 0 - 149 mg/dL   ECG 12 Lead   Result Value Ref Range    Ventricular Rate 55 BPM    QRS Duration 96 ms    QT Interval 436 ms    QTC Calculation(Bazett) 417 ms    R Axis 15 degrees    T Axis 68 degrees    QRS Count 9 beats    Q Onset 213 ms    T Offset 431 ms    QTC Fredericia 423 ms   Troponin, High Sensitivity, 1 Hour   Result Value Ref Range    Troponin I, High Sensitivity 8 0 - 20 ng/L   Hemoglobin A1C   Result Value Ref Range    Hemoglobin A1C 5.2 See comment %    Estimated Average Glucose 103 Not Established mg/dL   Lavender Top   Result Value Ref Range    Extra Tube Hold  for add-ons.    PST Top   Result Value Ref Range    Extra Tube Hold for add-ons.    Transthoracic Echo (TTE) Complete   Result Value Ref Range    AV pk julissa 0.88 m/s    LVOT diam 2.09 cm    Tricuspid annular plane systolic excursion 2.6 cm    LV Biplane EF 59 %    LA vol index A/L 65.3 ml/m2    LV EF 63 %    RV free wall pk S' 10.11 cm/s    RVSP 32.1 mmHg    LVIDd 5.37 cm    AV pk grad 3 mmHg    Aortic Valve Area by Continuity of Peak Velocity 2.57 cm2    LV A4C EF 59.7      CT angio head and neck w and wo IV contrast    Result Date: 3/7/2025  Interpreted By:  Brady Osman, STUDY: CT ANGIO HEAD AND NECK W AND WO IV CONTRAST;  3/7/2025 1:01 pm   INDICATION: Signs/Symptoms:Stroke workup for CRAO, rule out carotid occlusion.     COMPARISON: Brain MRI of March 7, 2025 and head CT of March 6, 2020   ACCESSION NUMBER(S): [id number]   ORDERING CLINICIAN: JEFE DÍAZ   TECHNIQUE: Unenhanced CT images of the head were obtained. Subsequently, 75 ML of Omnipaque 350 was administered intravenously and axial images of the head and neck were acquired.  Coronal, sagittal, and 3-D reconstructions were provided for review.   FINDINGS: Head:   There has been no intercurrent ischemia, hemorrhage, herniation, or hydrocephalus.   Again noted are encephalomalacia defects involving the right subinsular, parietal, and posterior temporal lobes along with a completed lacunar type defect within the left cerebellum.   CTA NECK FINDINGS:   Imaged aortic arch: There is a typical branching pattern.   Right common and cervical internal carotid arteries:There is no significant flow-limiting stenosis.  There is a beaded appearance of the distal right ICA.   Left common and cervical internal carotid arteries:There is no significant flow-limiting stenosis. There is a mild beaded appearance of the distal left ICA.   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   Left cervical vertebral artery:There is no significant flow-limiting stenosis.   Soft tissues of the neck: No neck masses are noted.   Chest: The lungs are aerated.   Osseous structures: No destructive lesions are present. Diffuse degenerative changes are present.   CTA HEAD FINDINGS:   Vascular malformations: There is no evidence of aneurysm formation or arteriovenous malformation.   Cavernous segments:  Atherosclerotic changes are present without significant flow-limiting stenosis.   Right anterior circulation: There is no significant flow-limiting stenosis, aneurysm, or other vascular abnormality.   Left anterior circulation: There is no significant flow-limiting stenosis, aneurysm, or other vascular abnormality.   Posterior circulation:  There is no significant flow-limiting stenosis, aneurysm, or other vascular abnormality.   Anterior and Posterior communicating arteries: The anterior and both posterior communicating arteries are visualized.   Other: The major dural sinuses are normally opacified.       1. Negative head CT for acute change. 2. Negative cervical CTA for significant flow-limiting stenosis. 3. There is a beaded appearance of the distal internal cervical arteries with the right side more involved than the left. This can be seen with fibromuscular dysplasia. 4. Negative intracranial CTA for large vessel occlusion.   MACRO: None   Signed by: Brady Osman 3/7/2025 1:22 PM Dictation workstation:   [id number]    Transthoracic Echo (TTE) Complete    Result Date: 3/7/2025           [place] [address]            Phone [phone] TRANSTHORACIC ECHOCARDIOGRAM REPORT Patient Name:       MARGE Barreto Physician:    [id number] Dez Laws DO Study Date:         3/7/2025            Ordering Provider:    [id number] SHANNAN BOSTON MRN/PID:            [id number]            Fellow:  Accession#:         [id number]        Nurse: Date of Birth/Age:  11/13/1931 / 89     Sonographer:          Nabil Villasenor RDCS                     years Gender Assigned at  M                   Additional Staff: Birth: Height:             182.88 cm           Admit Date: Weight:             72.57 kg            Admission Status:     Outpatient BSA / BMI:          1.94 m2 / 21.70     Department Location:  [facility]                     kg/m2 Blood Pressure: 138 /82 mmHg Study Type:    TRANSTHORACIC ECHO (TTE) COMPLETE Diagnosis/ICD: Transient cerebral ischemic attack, unspecified (NOT on                LCD)-G45.9 Indication:    Transischemic Attack CPT Codes:     Echo Complete w Full [id number] Patient History: Pertinent History: AF. Study Detail: The following Echo studies were performed: 2D, M-Mode, Doppler and               color flow.  PHYSICIAN INTERPRETATION: Left Ventricle: Left ventricular ejection fraction is normal, by visual estimate at 60-65%. There are no regional wall motion abnormalities. The left ventricular cavity size is normal. There is normal septal and mildly increased posterior left ventricular wall thickness. Spectral Doppler shows a Grade III (restrictive pattern) of left ventricular diastolic filling with an elevated left atrial pressure. Left Atrium: The left atrial size is moderate to severely dilated. Right Ventricle: The right ventricle is normal in size. There is normal right ventricular global systolic function. Right Atrium: The right atrial size is moderately dilated. Aortic Valve: The aortic valve is trileaflet. There is trace aortic valve regurgitation. The peak instantaneous gradient of the aortic valve is 3 mmHg. Mitral Valve: The mitral valve is normal in structure. There is moderately decreased mitral valve posterior leaflet mobility. There is moderate to severe mitral valve regurgitation. Tricuspid Valve: The tricuspid valve is structurally normal. There is moderate  tricuspid regurgitation. Pulmonic Valve: The pulmonic valve is not well visualized. The pulmonic valve regurgitation was not well visualized. Pericardium: No pericardial effusion noted. Aorta: The aortic root is normal.  CONCLUSIONS:  1. Left ventricular ejection fraction is normal, by visual estimate at 60-65%.  2. Spectral Doppler shows a a Grade III (restrictive pattern) of left ventricular diastolic filling with an elevated left atrial pressure.  3. There is normal right ventricular global systolic function.  4. The left atrial size is moderate to severely dilated.  5. The right atrial size is moderately dilated.  6. Moderate to severe mitral valve regurgitation.  7. Moderately decreased mitral valve posterior leaflet mobility.  8. Moderate tricuspid regurgitation. QUANTITATIVE DATA SUMMARY:  2D MEASUREMENTS:             Normal Ranges: LAs:             5.58 cm     (2.7-4.0cm) IVSd:            1.03 cm     (0.6-1.1cm) LVPWd:           1.08 cm     (0.6-1.1cm) LVIDd:           5.37 cm     (3.9-5.9cm) LVIDs:           3.13 cm LV Mass Index:   113.8 g/m2 LVEDV Index:     42.41 ml/m2 LV % FS          41.8 %  LEFT ATRIUM:                  Normal Ranges: LA Vol A4C:        120.0 ml   (22+/-6mL/m2) LA Vol A2C:        105.0 ml LA Vol BP:         126.5 ml LA Vol Index A4C:  61.9 ml/m2 LA Vol Index A2C:  54.2 ml/m2 LA Vol Index BP:   65.3 ml/m2 LA Area A4C:       33.6 cm2 LA Area A2C:       27.9 cm2 LA Major Axis A4C: 8.0 cm LA Major Axis A2C: 6.3 cm LA Vol A4C:        111.4 ml LA Vol A2C:        99.7 ml LA Vol Index BSA:  54.5 ml/m2  RIGHT ATRIUM:                 Normal Ranges: RA Vol A4C:        115.5 ml   (8.3-19.5ml) RA Vol Index A4C:  59.6 ml/m2 RA Area A4C:       31.5 cm2 RA Major Axis A4C: 7.3 cm  AORTA MEASUREMENTS:         Normal Ranges: Ao Sinus, d:        3.60 cm (2.1-3.5cm) Asc Ao, d:          3.70 cm (2.1-3.4cm)  LV SYSTOLIC FUNCTION:                      Normal Ranges: EF-A4C View:    60 % (>=55%) EF-A2C View:     58 % EF-Biplane:     59 % EF-Visual:      63 % LV EF Reported: 63 %  LV DIASTOLIC FUNCTION:           Normal Ranges: MV Peak E:             0.89 m/s  (0.7-1.2 m/s) MV e'                  0.157 m/s (>8.0) MV lateral e'          0.21 m/s MV medial e'           0.11 m/s E/e' Ratio:            5.68      (<8.0)  MITRAL VALVE:          Normal Ranges: MV DT:        160 msec (150-240msec)  AORTIC VALVE:           Normal Ranges: AoV Vmax:      0.88 m/s (<=1.7m/s) AoV Peak PG:   3.1 mmHg (<20mmHg) LVOT Max Alex:  0.66 m/s (<=1.1m/s) LVOT VTI:      14.33 cm LVOT Diameter: 2.09 cm  (1.8-2.4cm) AoV Area,Vmax: 2.57 cm2 (2.5-4.5cm2)  RIGHT VENTRICLE: RV Basal 4.00 cm RV Mid   2.10 cm RV Major 5.5 cm TAPSE:   25.6 mm RV s'    0.10 m/s  TRICUSPID VALVE/RVSP:          Normal Ranges: Peak TR Velocity:     2.70 m/s RV Syst Pressure:     32 mmHg  (< 30mmHg)  PULMONIC VALVE:          Normal Ranges: PV Max Alex:     0.6 m/s  (0.6-0.9m/s) PV Max P.7 mmHg  AORTA: Asc Ao Diam 3.60 cm  [id number] Dez Laws DO Electronically signed on 3/7/2025 at 12:57:08 PM  ** Final **     ECG 12 Lead    Result Date: 3/7/2025  Atrial fibrillation with slow ventricular response Abnormal ECG When compared with ECG of 2023 03:33, Nonspecific T wave abnormality now evident in Lateral leads Confirmed by Alexander Biggs ([id number]) on 3/7/2025 12:42:50 PM    MR brain wo IV contrast    Result Date: 3/7/2025  Interpreted By:  Imelda Dennis, STUDY: MR BRAIN WO IV CONTRAST;  3/7/2025 8:14 am   INDICATION: Signs/Symptoms:tia.     COMPARISON: CT head 2025   ACCESSION NUMBER(S): [id number]   ORDERING CLINICIAN: SHANNAN BOSTON   TECHNIQUE: Axial T2, FLAIR, DWI, gradient echo T2 and sagittal and coronal T1 weighted images of brain were acquired.   FINDINGS: CSF Spaces: The ventricles, sulci and basal cisterns are prominent compatible with mild-to-moderate volume loss. There is no extra-axial fluid collection.   Parenchyma: There is no diffusion restriction  abnormality to suggest acute infarct.  There is focal encephalomalacia and gliosis within the right subinsular region, right parietal lobe and right posterior temporal lobe compatible with prior infarcts. There is a mild degree of nonspecific subcortical and periventricular T2 and FLAIR hyperintense signal compatible with microangiopathy. Tiny old lacunar infarct within the left cerebellum. There is no mass effect or midline shift. Cerebellar tonsils are above the foramen magnum. Pituitary and sella are not enlarged. No abnormal susceptibility artifact.   Paranasal Sinuses and Mastoids: Mild mucosal thickening within the ethmoid air cells, right maxillary sinus and opacification of the sphenoid sinus. Mastoid air cells are clear. Evidence of bilateral lens surgery.       No evidence of acute infarct, intracranial mass effect or midline shift.   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   Old right subinsular, parietal and posterior temporal lobe infarcts. Old lacunar infarct within the left cerebellum.   MACRO: None   Signed by: Imelda Dennis 3/7/2025 8:32 AM Dictation workstation:   [id number]    CT head wo IV contrast    Result Date: 3/6/2025  Interpreted By:  Yair Sanchez, STUDY: CT HEAD WO IV CONTRAST;  3/6/2025 10:36 pm   INDICATION: Signs/Symptoms:vision change.   COMPARISON: CT scan of the head 11/09/2023.   ACCESSION NUMBER(S): [id number]   ORDERING CLINICIAN: JEFE PAREDES   TECHNIQUE: Axial noncontrast CT images of the head.   FINDINGS: BRAIN PARENCHYMA: Stable focal area of encephalomalacia in the right parietal lobe consistent with sequela of remote infarct. Gray-white matter interfaces are otherwise preserved. No mass, mass effect or midline shift. Mild deep and periventricular white matter hypodensities are nonspecific, but favored to represent chronic small vessel ischemic changes.   HEMORRHAGE: No acute intracranial hemorrhage. VENTRICLES and EXTRA-AXIAL SPACES:  Moderate volume loss with prominence of the ventricles and sulci.. EXTRACRANIAL SOFT TISSUES: Within normal limits. PARANASAL SINUSES/MASTOIDS: The visualized paranasal sinuses and mastoid air cells are aerated. CALVARIUM: No depressed skull fracture. No destructive osseous lesion.   OTHER FINDINGS: Atherosclerotic calcification of the carotid siphons and vertebral arteries.       No acute intracranial abnormality.   Chronic changes as noted above.   MACRO: None   Signed by: Yair Sanchez 3/6/2025 11:27 PM Dictation workstation:   [id number]    XR chest 1 view    Result Date: 3/6/2025  STUDY: Chest Radiograph;  03/06/2025 09:08PM INDICATION: Left sided tingling. COMPARISON: XR Chest 03/16/2023 ACCESSION NUMBER(S): [id number] ORDERING CLINICIAN: JEFE PAREDES TECHNIQUE:  Frontal chest was obtained at 21:08 hours. FINDINGS: CARDIOMEDIASTINAL SILHOUETTE: Cardiomediastinal silhouette is within the upper limits for normal in size and configuration.  LUNGS: Lungs are clear.  ABDOMEN: No remarkable upper abdominal findings.  BONES: No acute osseous changes.    No acute cardiopulmonary process. Signed by Francisco Lea MD        Assessment/Plan   Assessment & Plan  TIA (transient ischemic attack)    Atrial fibrillation (Multi)    Essential hypertension    Amaurosis fugax of right eye      MRI of the brain  Neurology consult  Continue Eliquis  Continue statin  See orders for details       I spent  minutes in the professional and overall care of this patient.      Kati Langley MD

## 2025-03-07 NOTE — ED PROVIDER NOTES
HPI   Chief Complaint   Patient presents with    Loss of Vision     Patient states he was watching TV and he lost vision in his left eye. Patient states he has history of this and was told he had TIAs. Patient has no current complaints and states it lasted only 5 min.        HPI   89-year-old male coming into the emergency department for evaluation of transient vision loss left eye.  Says that he has a history of stroke and TIAs, they always are on the left side.  He was watching television when this episode happened, says that it lasted less than 5 minutes.  He now says he feels fine however is concerned because this is what happened the last time he was told he had a TIA and wanted to come in for assessment.      Patient History   Past Medical History:   Diagnosis Date    Atrial fibrillation (Multi)     Cancer (Multi)     Hypertension     TIA (transient ischemic attack) 01/01/1996     Past Surgical History:   Procedure Laterality Date    MR HEAD ANGIO WO IV CONTRAST  12/21/2017    MR HEAD ANGIO WO IV CONTRAST [facility]     Family History   Problem Relation Name Age of Onset    No Known Problems Mother      No Known Problems Father       Social History     Tobacco Use    Smoking status: Former     Types: Cigarettes    Smokeless tobacco: Never   Vaping Use    Vaping status: Never Used   Substance Use Topics    Alcohol use: Yes     Alcohol/week: 2.0 standard drinks of alcohol     Types: 2 Glasses of wine per week    Drug use: Never       Physical Exam   ED Triage Vitals [03/06/25 2003]   Temperature Heart Rate Respirations BP   36.5 °C (97.7 °F) 72 16 135/77      Pulse Ox Temp Source Heart Rate Source Patient Position   98 % Oral Monitor Sitting      BP Location FiO2 (%)     Left arm --       Physical Exam    PHYSICAL EXAMINATION    GENERAL APPEARANCE: Awake and alert.     VITAL SIGNS: As per the nurses' triage record.     HEENT: Normocephalic, atraumatic. Extraocular muscles are intact. Pupils equal round  and reactive to light. Conjunctiva are pink. Negative scleral icterus. Mucous membranes are moist. Tongue in the midline. Pharynx was without erythema or exudates, uvula midline    NECK: Soft Nontender and supple, full gross ROM, no meningeal signs.    CHEST: Nontender to palpation. Clear to auscultation bilaterally. No rales, rhonchi, or wheezing.     HEART: S1, S2. Regular rate and rhythm. No murmurs, gallops or rubs.  Strong and equal pulses in the extremities.     ABDOMEN: Soft, nontender, nondistended, positive bowel sounds, no palpable masses.    MUSCULOSKELETAL: The calves are nontender to palpation. Full gross active range of motion. Ambulating on own with no acute difficulties     NEUROLOGICAL: Awake, alert and oriented x 3. Power intact in the upper and lower extremities. Sensation is intact to light touch in the upper and lower extremities.       Interval Baseline; National Institutes of Health Stroke Scale Score  1a. LOC Alert, Keenly Responsive (0), 1b. LOC Questions Answers Both Questions Correctly (0), 1c. LOC Commands Performs Both Task Correctly (0), 2. Best Gaze Normal (0), 3. Visual No Visual Loss (0), 4. Facial Palsy Normal Symmetrical Movements (0), 5a. Motor Arm Left No Drift - Limb Holds 90 or 45 Degrees for Full 10 Seconds (0), 5b. Motor Arm Right No Drift - Limb Holds 90 or 45 Degrees for Full 10 Seconds (0), 6a. Motor Leg Left No Drift - Limb Holds 90 or 45 Degrees for Full 10 Seconds (0), 6b. Motor Leg Right No Drift - Limb Holds 90 or 45 Degrees for Full 10 Seconds (0), 7. Limb Ataxia Absent (0), 8. Sensory Normal - No Sensory Loss (0), 9. Best Language No Aphasia - Normal (0), 10. Dysarthria Normal (0), 11. Extinction and Inattention No Abnormality (0), Stroke Scale Total 0.    Additionally, the patient has a reassuring test of skew.    IMMUNOLOGICAL: No lymphatic streaking noted     DERM: No petechiae, rashes, or ecchymoses.    ED Course & MDM   ED Course as of 03/06/25 2344   Thu  Mar 06, 2025   2030 I independently interpreted the EKG and it showed atrial fibrillation with a rate of 55 bpm, normal axis, LVH criteria, and a slight diffuse flattening of the T waves in the inferior leads [NG]   2342 Spoke to dr Langley who accepted the patient [AP]      ED Course User Index  [AP] Mikey Sharpe PA-C  [NG] Samara Gilman MD         Diagnoses as of 03/06/25 2344   TIA (transient ischemic attack)   Transient vision disturbance                 No data recorded     Diandra Coma Scale Score: 15 (03/06/25 2210 : Aiden Gonzalez, JUAN R)                           Medical Decision Making  Parts of this chart have been completed using voice recognition software. Please excuse any errors of transcription.  My thought process and reason for plan has been formulated from the time that I saw the patient until the time of disposition and is not specific to one specific moment during their visit and furthermore my MDM encompasses this entire chart and not only this text box.      HPI: Detailed above.    Exam: A medically appropriate exam performed, outlined above, given the known history and presentation.    History Limited by:   Nothing    History obtained from:  the patient    External/internal records reviewed:  no external record reviewed     Social Determinants of Health considered during this visit: Lives at home    Chronic conditions impacting care: Previous CVA    Medications given during visit:  Medications - No data to display     Diagnostic/tests  Labs Reviewed   CBC WITH AUTO DIFFERENTIAL - Abnormal       Result Value    WBC 6.1      nRBC 0.0      RBC 3.83 (*)     Hemoglobin 12.1 (*)     Hematocrit 36.6 (*)     MCV 96      MCH 31.6      MCHC 33.1      RDW 13.8      Platelets 189      Neutrophils % 56.6      Immature Granulocytes %, Automated 0.2      Lymphocytes % 25.8      Monocytes % 11.4      Eosinophils % 4.4      Basophils % 1.6      Neutrophils Absolute 3.47      Immature Granulocytes  Absolute, Automated 0.01      Lymphocytes Absolute 1.58      Monocytes Absolute 0.70      Eosinophils Absolute 0.27      Basophils Absolute 0.10     COMPREHENSIVE METABOLIC PANEL - Abnormal    Glucose 121 (*)     Sodium 138      Potassium 4.7      Chloride 104      Bicarbonate 28      Anion Gap 11      Urea Nitrogen 24 (*)     Creatinine 0.93      eGFR 77      Calcium 9.0      Albumin 3.7      Alkaline Phosphatase 43      Total Protein 6.3 (*)     AST 26      Bilirubin, Total 1.1      ALT 12     SERIAL TROPONIN-INITIAL - Normal    Troponin I, High Sensitivity 8      Narrative:     Less than 99th percentile of normal range cutoff-  Female and children under 18 years old <14 ng/L; Male <21 ng/L: Negative  Repeat testing should be performed if clinically indicated.     Female and children under 18 years old 14-50 ng/L; Male 21-50 ng/L:  Consistent with possible cardiac damage and possible increased clinical   risk. Serial measurements may help to assess extent of myocardial damage.     >50 ng/L: Consistent with cardiac damage, increased clinical risk and  myocardial infarction. Serial measurements may help assess extent of   myocardial damage.      NOTE: Children less than 1 year old may have higher baseline troponin   levels and results should be interpreted in conjunction with the overall   clinical context.     NOTE: Troponin I testing is performed using a different   testing methodology at [facility] than at other   [facility]. Direct result comparisons should only   be made within the same method.   SERIAL TROPONIN, 1 HOUR - Normal    Troponin I, High Sensitivity 8      Narrative:     Less than 99th percentile of normal range cutoff-  Female and children under 18 years old <14 ng/L; Male <21 ng/L: Negative  Repeat testing should be performed if clinically indicated.     Female and children under 18 years old 14-50 ng/L; Male 21-50 ng/L:  Consistent with possible cardiac damage and possible  increased clinical   risk. Serial measurements may help to assess extent of myocardial damage.     >50 ng/L: Consistent with cardiac damage, increased clinical risk and  myocardial infarction. Serial measurements may help assess extent of   myocardial damage.      NOTE: Children less than 1 year old may have higher baseline troponin   levels and results should be interpreted in conjunction with the overall   clinical context.     NOTE: Troponin I testing is performed using a different   testing methodology at [facility] than at other   [facility]. Direct result comparisons should only   be made within the same method.   TROPONIN SERIES- (INITIAL, 1 HR)    Narrative:     The following orders were created for panel order Troponin I Series, High Sensitivity (0, 1 HR).  Procedure                               Abnormality         Status                     ---------                               -----------         ------                     Troponin I, High Sensiti...[[id number]]  Normal              Final result               Troponin, High Sensitivi...[[id number]]  Normal              Final result                 Please view results for these tests on the individual orders.      CT head wo IV contrast   Final Result   No acute intracranial abnormality.        Chronic changes as noted above.        MACRO:   None        Signed by: Yair Sanchez 3/6/2025 11:27 PM   Dictation workstation:   [id number]      XR chest 1 view   Final Result   No acute cardiopulmonary process.   Signed by Francisco Lea MD           EKG: Obtained read by attending physician reviewed by myself and per my interpretation no sign of STEMI criteria      Case discussed with: dr cardona      Considerations/further MDM:  I spoke with Dr. cardona. We thoroughly discussed the history, physical exam, laboratory and imaging studies, as well as, emergency department course. Based upon that discussion, we've decided to admit for further  observation and evaluation of  symptoms.  As I have deemed necessary from their history, physical and studies, I have considered and evaluated for the following diagnoses: Stroke, cavernous thrombosis, diabetes, intracranial hemorrhage or hematoma, meningitis, sepsis    Patient has been seen in conjunction with attending physician Dr. Urbina      Procedure  Procedures     Mikey Sharpe PA-C  03/06/25 5277

## 2025-03-07 NOTE — PROGRESS NOTES
03/07/25 1429   Discharge Planning   Living Arrangements Alone;Other (Comment)  (with robust friend and neighbor support)   Support Systems Children;Friends/neighbors   Assistance Needed independent in adls and mobility; pt drives   Type of Residence Private residence   Number of Stairs to Enter Residence 7   Number of Stairs Within Residence 1   Do you have animals or pets at home? No   Who is requesting discharge planning? Provider   Home or Post Acute Services None   Expected Discharge Disposition Home   Does the patient need discharge transport arranged? No     Cm met with patient at bedside. Pt independent in adls and mobility. [facility]. Pt also sees PCP Dr. Sharma (per face sheet) once per year. Pt not amenable to home care at this time. Other facesheet details verified for accuracy. Pt does not have his son (Jewel Whitmore) phone number available. Pt reports we can contact his friend Syed Little at [phone] for this hospitalization as an emergency contact. Syed will also be his ride home.     Dispo: Home self care  JOVITA+1 per chart   CM following.   Discharge plan NOT finalized. Please contact [facility] prior to attempting to discharge this patient. Thank you.

## 2025-03-08 VITALS
HEART RATE: 60 BPM | BODY MASS INDEX: 22.19 KG/M2 | RESPIRATION RATE: 19 BRPM | OXYGEN SATURATION: 95 % | DIASTOLIC BLOOD PRESSURE: 67 MMHG | SYSTOLIC BLOOD PRESSURE: 132 MMHG | WEIGHT: 163.8 LBS | HEIGHT: 72 IN | TEMPERATURE: 96.5 F

## 2025-03-08 LAB
ANION GAP SERPL CALCULATED.3IONS-SCNC: 11 MMOL/L (ref 10–20)
BUN SERPL-MCNC: 19 MG/DL (ref 6–23)
CALCIUM SERPL-MCNC: 8.5 MG/DL (ref 8.6–10.3)
CHLORIDE SERPL-SCNC: 104 MMOL/L (ref 98–107)
CO2 SERPL-SCNC: 27 MMOL/L (ref 21–32)
CREAT SERPL-MCNC: 0.81 MG/DL (ref 0.5–1.3)
EGFRCR SERPLBLD CKD-EPI 2021: 82 ML/MIN/1.73M*2
ERYTHROCYTE [DISTWIDTH] IN BLOOD BY AUTOMATED COUNT: 13.5 % (ref 11.5–14.5)
GLUCOSE SERPL-MCNC: 83 MG/DL (ref 74–99)
HCT VFR BLD AUTO: 32.6 % (ref 41–52)
HGB BLD-MCNC: 11.3 G/DL (ref 13.5–17.5)
MCH RBC QN AUTO: 31.6 PG (ref 26–34)
MCHC RBC AUTO-ENTMCNC: 34.7 G/DL (ref 32–36)
MCV RBC AUTO: 91 FL (ref 80–100)
NRBC BLD-RTO: 0 /100 WBCS (ref 0–0)
PLATELET # BLD AUTO: 178 X10*3/UL (ref 150–450)
POTASSIUM SERPL-SCNC: 4 MMOL/L (ref 3.5–5.3)
RBC # BLD AUTO: 3.58 X10*6/UL (ref 4.5–5.9)
SODIUM SERPL-SCNC: 138 MMOL/L (ref 136–145)
WBC # BLD AUTO: 6.6 X10*3/UL (ref 4.4–11.3)

## 2025-03-08 PROCEDURE — 80048 BASIC METABOLIC PNL TOTAL CA: CPT | Performed by: INTERNAL MEDICINE

## 2025-03-08 PROCEDURE — 96361 HYDRATE IV INFUSION ADD-ON: CPT

## 2025-03-08 PROCEDURE — 99238 HOSP IP/OBS DSCHRG MGMT 30/<: CPT | Performed by: INTERNAL MEDICINE

## 2025-03-08 PROCEDURE — 2500000001 HC RX 250 WO HCPCS SELF ADMINISTERED DRUGS (ALT 637 FOR MEDICARE OP): Performed by: INTERNAL MEDICINE

## 2025-03-08 PROCEDURE — 36415 COLL VENOUS BLD VENIPUNCTURE: CPT | Performed by: INTERNAL MEDICINE

## 2025-03-08 PROCEDURE — 85027 COMPLETE CBC AUTOMATED: CPT | Performed by: INTERNAL MEDICINE

## 2025-03-08 PROCEDURE — G0378 HOSPITAL OBSERVATION PER HR: HCPCS

## 2025-03-08 RX ORDER — PANTOPRAZOLE SODIUM 40 MG/1
40 TABLET, DELAYED RELEASE ORAL
Qty: 30 TABLET | Refills: 0 | Status: SHIPPED | OUTPATIENT
Start: 2025-03-09

## 2025-03-08 RX ORDER — ASPIRIN 81 MG/1
81 TABLET ORAL DAILY
Start: 2025-03-09

## 2025-03-08 RX ORDER — ATORVASTATIN CALCIUM 40 MG/1
40 TABLET, FILM COATED ORAL NIGHTLY
Qty: 30 TABLET | Refills: 0 | Status: SHIPPED | OUTPATIENT
Start: 2025-03-08

## 2025-03-08 RX ADMIN — APIXABAN 2.5 MG: 2.5 TABLET, FILM COATED ORAL at 08:10

## 2025-03-08 RX ADMIN — PANTOPRAZOLE SODIUM 40 MG: 40 TABLET, DELAYED RELEASE ORAL at 08:10

## 2025-03-08 RX ADMIN — CALCIUM CARBONATE 500 MG: 500 TABLET, CHEWABLE ORAL at 00:12

## 2025-03-08 RX ADMIN — CALCIUM CARBONATE 500 MG: 500 TABLET, CHEWABLE ORAL at 08:10

## 2025-03-08 ASSESSMENT — COGNITIVE AND FUNCTIONAL STATUS - GENERAL
MOBILITY SCORE: 24
DAILY ACTIVITIY SCORE: 24

## 2025-03-08 ASSESSMENT — PAIN SCALES - GENERAL
PAINLEVEL_OUTOF10: 0 - NO PAIN

## 2025-03-08 ASSESSMENT — PAIN - FUNCTIONAL ASSESSMENT
PAIN_FUNCTIONAL_ASSESSMENT: 0-10
PAIN_FUNCTIONAL_ASSESSMENT: FLACC (FACE, LEGS, ACTIVITY, CRY, CONSOLABILITY)
PAIN_FUNCTIONAL_ASSESSMENT: FLACC (FACE, LEGS, ACTIVITY, CRY, CONSOLABILITY)

## 2025-03-08 NOTE — PROGRESS NOTES
"Alvin Whitmore is a 89 y.o. male on day 0 of admission presenting with TIA (transient ischemic attack).    Subjective   Today patient has no symptoms feeling better       Objective     Physical Exam  Vitals reviewed.   Constitutional:       Appearance: Normal appearance.   HENT:      Head: Normocephalic and atraumatic.      Right Ear: Tympanic membrane, ear canal and external ear normal.      Left Ear: Tympanic membrane, ear canal and external ear normal.      Nose: Nose normal.      Mouth/Throat:      Pharynx: Oropharynx is clear.   Eyes:      Extraocular Movements: Extraocular movements intact.      Conjunctiva/sclera: Conjunctivae normal.      Pupils: Pupils are equal, round, and reactive to light.   Cardiovascular:      Rate and Rhythm: Normal rate. Rhythm irregular.      Pulses: Normal pulses.      Heart sounds: Normal heart sounds.   Pulmonary:      Effort: Pulmonary effort is normal.      Breath sounds: Normal breath sounds.   Abdominal:      General: Abdomen is flat. Bowel sounds are normal.      Palpations: Abdomen is soft.   Musculoskeletal:      Cervical back: Normal range of motion and neck supple.   Skin:     General: Skin is warm and dry.   Neurological:      General: No focal deficit present.      Mental Status: He is alert and oriented to person, place, and time.   Psychiatric:         Mood and Affect: Mood normal.         Last Recorded Vitals  Blood pressure 132/67, pulse 60, temperature 35.8 °C (96.5 °F), temperature source Temporal, resp. rate 19, height 1.83 m (6' 0.05\"), weight 74.3 kg (163 lb 12.8 oz), SpO2 95%.  Intake/Output last 3 Shifts:  I/O last 3 completed shifts:  In: 452.3 (6.1 mL/kg) [P.O.:229; I.V.:223.3 (3 mL/kg)]  Out: 309 (4.2 mL/kg) [Urine:309 (0.1 mL/kg/hr)]  Weight: 74.3 kg     Relevant Results               Results for orders placed or performed during the hospital encounter of 03/06/25 (from the past 24 hours)   CBC   Result Value Ref Range    WBC 6.6 4.4 - 11.3 x10*3/uL "    nRBC 0.0 0.0 - 0.0 /100 WBCs    RBC 3.58 (L) 4.50 - 5.90 x10*6/uL    Hemoglobin 11.3 (L) 13.5 - 17.5 g/dL    Hematocrit 32.6 (L) 41.0 - 52.0 %    MCV 91 80 - 100 fL    MCH 31.6 26.0 - 34.0 pg    MCHC 34.7 32.0 - 36.0 g/dL    RDW 13.5 11.5 - 14.5 %    Platelets 178 150 - 450 x10*3/uL   Basic Metabolic Panel   Result Value Ref Range    Glucose 83 74 - 99 mg/dL    Sodium 138 136 - 145 mmol/L    Potassium 4.0 3.5 - 5.3 mmol/L    Chloride 104 98 - 107 mmol/L    Bicarbonate 27 21 - 32 mmol/L    Anion Gap 11 10 - 20 mmol/L    Urea Nitrogen 19 6 - 23 mg/dL    Creatinine 0.81 0.50 - 1.30 mg/dL    eGFR 82 >60 mL/min/1.73m*2    Calcium 8.5 (L) 8.6 - 10.3 mg/dL   L                   Assessment/Plan   Assessment & Plan  TIA (transient ischemic attack)    Atrial fibrillation (Multi)    Essential hypertension    Amaurosis fugax of right eye    No acute CVA  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      Kati Langley MD

## 2025-03-08 NOTE — DISCHARGE SUMMARY
Discharge Diagnosis  TIA (transient ischemic attack)    Issues Requiring Follow-Up  Vision changes  A-fib  Mitral valve regurg  Hyperlipidemia    Test Results Pending At Discharge  Pending Labs       No current pending labs.            Hospital Course   Alvin Whitmore is a 89 y.o. male presenting with loss of vision in the left eye while watching TV.  He felt curtain coming down to complete blackness patient has history of this in the past and was diagnosed with TIAs.  He also has history of lacunar infarcts.  By the time EMS came his symptoms had resolved with only lasted for 5 minutes.  He had another episode of flashing lights in the left eye while in the ER lasting for few minutes.  He is compliant with the medication and did not miss his Eliquis   MRI was negative for stroke.  CTA not show any stenosis, echo normal ejection fraction severe mitral regurg.  Patient advised to follow-up with ophthalmologist.  Cleared by neurology and cardiology for discharge    Pertinent Physical Exam At Time of Discharge  Physical Exam  Vitals reviewed.   Constitutional:       Appearance: Normal appearance.   HENT:      Head: Normocephalic and atraumatic.      Right Ear: Tympanic membrane, ear canal and external ear normal.      Left Ear: Tympanic membrane, ear canal and external ear normal.      Nose: Nose normal.      Mouth/Throat:      Pharynx: Oropharynx is clear.   Eyes:      Extraocular Movements: Extraocular movements intact.      Conjunctiva/sclera: Conjunctivae normal.      Pupils: Pupils are equal, round, and reactive to light.   Cardiovascular:      Rate and Rhythm: Normal rate and regular rhythm.      Pulses: Normal pulses.      Heart sounds: Normal heart sounds.   Pulmonary:      Effort: Pulmonary effort is normal.      Breath sounds: Normal breath sounds.   Abdominal:      General: Abdomen is flat. Bowel sounds are normal.      Palpations: Abdomen is soft.   Musculoskeletal:      Cervical back: Normal range of  motion and neck supple.   Skin:     General: Skin is warm and dry.   Neurological:      General: No focal deficit present.      Mental Status: He is alert and oriented to person, place, and time.   Psychiatric:         Mood and Affect: Mood normal.         Home Medications     Medication List      START taking these medications     aspirin 81 mg EC tablet; Take 1 tablet (81 mg) by mouth once daily.;   Start taking on: March 9, 2025   atorvastatin 40 mg tablet; Commonly known as: Lipitor; Take 1 tablet (40   mg) by mouth once daily at bedtime.   pantoprazole 40 mg EC tablet; Commonly known as: ProtoNix; Take 1 tablet   (40 mg) by mouth once daily in the morning. Take before meals. Do not   crush, chew, or split.; Start taking on: March 9, 2025     CONTINUE taking these medications     apixaban 5 mg tablet; Commonly known as: Eliquis   fish oil concentrate 120-180 mg capsule; Commonly known as: Omega-3   GLUCOSAMINE-CHONDROITIN ORAL   ketoconazole 2 % cream; Commonly known as: NIZOral   lubricating eye drops ophthalmic solution   metoprolol tartrate 25 mg tablet; Commonly known as: Lopressor   metroNIDAZOLE 0.75 % gel; Commonly known as: Metrogel       Outpatient Follow-Up  Future Appointments   Date Time Provider Department Center   8/4/2025 10:45 AM Chris Myers DO [id number] [facility]       Kati Langley MD

## 2025-03-08 NOTE — NURSING NOTE
Assumed care of patient. Pt alert and oriented sitting at the edge of the bed. Denies any pain or discomfort. Bed low with call bell in reach. Care ongoing.

## 2025-03-08 NOTE — NURSING NOTE
Patient refusing Lipitor states he has to get it approved by the [facility]. Risk and benefits explained . Cont. To refuse, care ongoing.

## 2025-03-08 NOTE — CONSULTS
Inpatient consult to Cardiology  Consult performed by: LONDON Hyatt-CNP  Consult ordered by: Kati Langley MD        History Of Present Illness:    Alvin Whitmore is a 89 y.o. male presenting with loss of vision in the left eye.  This patient follows with Dr. Gleason for cardiology. He has a past medical history of longstanding persistent atrial fibrillation on Eliquis, hypertensive disorder, hyperlipidemia, prior cerebrovascular accidents, TIAs and prostate cancer. The patient reports that he was watching a movie and suddenly noticed dimming of the left eye and then completely lost vision in the left eye.  This vision loss lasted approximately 10 to 15 minutes and then vision returned in a curtain like fashion.  He denies chest pain, pressure or palpitations.  Denies shortness of breath.  The patient was brought by EMS to the [facility] emergency department.  EKG in the emergency department revealing lab work in the emergency department significant for sodium of 138, calcium 4.7, creatinine 0.93 and hemoglobin of 12.1.  High-sensitivity troponin I levels negative x 2 at 8 and 8.  Chest x-ray without acute cardiopulmonary process.  CT of the head with no acute intracranial abnormality.  EKG in the emergency department revealing atrial fibrillation with a slow ventricular response of 55 bpm and nonspecific T wave abnormality but otherwise no evidence of acute ischemia.  The patient was started on IV fluids and admitted to the hospital on telemetry for further assessment and management.    Review of Systems   Neurological:         Left eye vision loss   All other systems reviewed and are negative.        Last Recorded Vitals:  Vitals:    03/08/25 0010 03/08/25 0403 03/08/25 0455 03/08/25 0709   BP: 129/63 142/77  (!) 148/95   BP Location: Right arm Left arm  Left arm   Patient Position: Lying Lying  Lying   Pulse: 60 62  53   Resp: 18 18  18   Temp: 36.2 °C (97.2 °F) 36.1 °C (97 °F)  35.9 °C (96.6 °F)    TempSrc: Temporal Temporal  Temporal   SpO2: 99% 95%  94%   Weight:   74.3 kg (163 lb 12.8 oz)    Height:           Last Labs:  CBC - 3/8/2025:  5:17 AM  6.6 11.3 178    32.6      CMP - 3/8/2025:  5:17 AM  8.5 6.3 26 --- 1.1   _ 3.7 12 43      PTT - No results in last year.  _   _ _     Troponin I, High Sensitivity   Date/Time Value Ref Range Status   03/06/2025 09:25 PM 8 0 - 20 ng/L Final   03/06/2025 08:25 PM 8 0 - 20 ng/L Final     BNP   Date/Time Value Ref Range Status   03/06/2025 08:25  (H) 0 - 99 pg/mL Final     Hemoglobin A1C   Date/Time Value Ref Range Status   03/07/2025 06:58 AM 5.2 See comment % Final   11/10/2023 06:40 PM 5.4 See below % Final     LDL Calculated   Date/Time Value Ref Range Status   03/06/2025 08:25 PM 88 <=99 mg/dL Final     Comment:                                 Near   Borderline      AGE      Desirable  Optimal    High     High     Very High     0-19 Y     0 - 109     ---    110-129   >/= 130     ----    20-24 Y     0 - 119     ---    120-159   >/= 160     ----      >24 Y     0 -  99   100-129  130-159   160-189     >/=190     11/10/2023 06:40 PM 88 65 - 130 mg/dL Final     VLDL   Date/Time Value Ref Range Status   03/06/2025 08:25 PM 16 0 - 40 mg/dL Final      Last I/O:  I/O last 3 completed shifts:  In: 452.3 (6.1 mL/kg) [P.O.:229; I.V.:223.3 (3 mL/kg)]  Out: 309 (4.2 mL/kg) [Urine:309 (0.1 mL/kg/hr)]  Weight: 74.3 kg     Past Cardiology Tests (Last 3 Years):  EKG:  ECG 12 Lead 03/06/2025      ECG 12 lead 11/13/2023    Echo:  Transthoracic Echo (TTE) Complete 03/07/2025      Transthoracic Echo (TTE) Complete 11/10/2023    Ejection Fractions:  EF   Date/Time Value Ref Range Status   03/07/2025 11:27 AM 63 %    11/10/2023 08:50 AM 59       Cath:  No results found for this or any previous visit from the past 1095 days.    Stress Test:  No results found for this or any previous visit from the past 1095 days.    Cardiac Imaging:  No results found for this or any previous visit  from the past 1095 days.      Past Medical History:  He has a past medical history of Atrial fibrillation (Multi), Cancer (Multi), Hypertension, and TIA (transient ischemic attack) (01/01/1996).    Past Surgical History:  He has a past surgical history that includes MR angio head wo IV contrast (12/21/2017).      Social History:  He reports that he has quit smoking. His smoking use included cigarettes. He has never used smokeless tobacco. He reports current alcohol use of about 2.0 standard drinks of alcohol per week. He reports that he does not use drugs.    Family History:  Family History   Problem Relation Name Age of Onset    No Known Problems Mother      No Known Problems Father          Allergies:  Patient has no known allergies.    Inpatient Medications:  Scheduled medications   Medication Dose Route Frequency    apixaban  2.5 mg oral BID    aspirin  81 mg oral Daily    atorvastatin  40 mg oral Nightly    calcium carbonate  500 mg oral TID    metoprolol tartrate  12.5 mg oral BID    pantoprazole  40 mg oral Daily before breakfast    perflutren lipid microspheres  0.5-10 mL of dilution intravenous Once in imaging    perflutren protein A microsphere  0.5 mL intravenous Once in imaging    sulfur hexafluoride microsphr  2 mL intravenous Once in imaging     PRN medications   Medication     Continuous Medications   Medication Dose Last Rate     Outpatient Medications:  Current Outpatient Medications   Medication Instructions    apixaban (ELIQUIS) 2.5 mg, oral, 2 times daily    fish oil concentrate (Omega-3) 120-180 mg capsule 1 capsule, oral, Daily    glucosamine HCl/chondroitin ojeda (GLUCOSAMINE-CHONDROITIN ORAL) 1 tablet, 2 times daily    ketoconazole (NIZOral) 2 % cream 1 Application, 2 times daily PRN    lubricating eye drops ophthalmic solution 1 drop, Both Eyes, As needed    metoprolol tartrate (Lopressor) 25 mg tablet 0.5 tablets, oral, 2 times daily, With food    metroNIDAZOLE (Metrogel) 0.75 % gel 1  Application, 2 times daily, prn     Physical Exam  Vitals reviewed.   Cardiovascular:      Rate and Rhythm: Normal rate. Rhythm irregular.   Pulmonary:      Effort: Pulmonary effort is normal.      Breath sounds: Normal breath sounds. No wheezing, rhonchi or rales.      Comments: No conversational dyspnea noted.  Abdominal:      General: Bowel sounds are normal.      Palpations: Abdomen is soft.   Musculoskeletal:      Right lower leg: No edema.      Left lower leg: No edema.   Skin:     General: Skin is warm and dry.      Capillary Refill: Capillary refill takes less than 2 seconds.   Neurological:      Mental Status: He is alert and oriented to person, place, and time.   Psychiatric:         Mood and Affect: Mood normal.         Behavior: Behavior normal.           Assessment/Plan   Longstanding Persistent Atrial Fibrillation  Acute Transient Ischemic Attack  Mitral Valve Regurgitation  Hypertensive Disorder  Hyperlipidemia  Previous Cerebrovascular Accident    3/8: As described above. Patient presenting with TIA. He has been evaluated by neurology services. Per the bedside RN, there was some concern for bradycardia on the patient's telemetry.  I reviewed the patient's telemetry for thoroughly and he has remained in atrial fibrillation without significant ectopy.  He does have occasional slow ventricular rates, but only while he was sleeping.  And on my review of outpatient EKGs the patient's rates have chronically been in the 50s and 60s.  Currently the patient is awake and alert resting comfortably in bed.  He denies chest pain, pressure or palpitations.  Denies lightheadedness or dizziness.  While awake ventricular rates are in the 70s and 80s.  Blood pressures are stable with last recorded at 148/95.  He is breathing comfortably on room air with pulse oximetry 94%.  Appears euvolemic on examination. Echocardiogram revealing an ejection fraction of 60 to 65%, grade 3 restrictive pattern of left ventricular  diastolic filling with an elevated left atrial pressure, normal right ventricular global systolic function, moderate to severely dilated left atrial size, moderately dilated right atrial size, moderate to severe mitral valve regurgitation and moderate tricuspid regurgitation.  At this point the patient is asymptomatic from his mitral valve regurgitation.  He has a preserved LV function and states he has no issue completing activities of daily living.  Will continue to monitor this mitral valve regurgitation closely in the outpatient setting.  Would continue beta-blocker therapy with metoprolol tartrate 12 and half milligrams twice daily.  Continue Eliquis 2.5 milligrams twice daily for stroke risk reduction in setting of atrial fibrillation.  Continue statin therapy. No objection from the cardiac perspective for discharge home today. The patient should follow-up with Dr. Myers in the outpatient setting in 2 to 3 weeks following discharge.    Peripheral IV 03/06/25 20 G Distal;Right;Upper Arm (Active)   Site Assessment Clean;Dry 03/07/25 2100   Dressing Type Transparent 03/07/25 2100   Line Status Infusing 03/07/25 2100   Dressing Status Clean;Dry 03/07/25 2100   Dressing Intervention Other (Comment) 03/07/25 2100   Number of days: 2       Code Status:  Full Code            Tamra Olivarez, APRN-CNP

## 2025-03-08 NOTE — NURSING NOTE
Assumed pt care 0700. Evi PETE reported telemetry alarmed neeta 30s-40s. MD made aware, cardiology currently not following patient. Consult will be put in to follow patient care. Continuing to monitor.    0800: pt ambulating to bathroom w/ IV pole    0820: V6 EKG lead broken, unable to obtain an accurate EKG    1250: DC orders in. Care coordinator notified. Discharge education complete at this time.

## 2025-03-08 NOTE — NURSING NOTE
Patient c/o tightness in upper abdomen/ plexus area. States he ate a really big pot roast dinner and that's when it started. EKG performed showing baseline rhythm which is A-fib with controlled rate. Dr. Mejia made aware, Will order protonix. Care ongoing.

## 2025-03-08 NOTE — CARE PLAN
The patient's goals for the shift include      The clinical goals for the shift include ambulate    Problem: Pain - Adult  Goal: Verbalizes/displays adequate comfort level or baseline comfort level  Outcome: Progressing     Problem: Safety - Adult  Goal: Free from fall injury  Outcome: Progressing     Problem: Discharge Planning  Goal: Discharge to home or other facility with appropriate resources  Outcome: Progressing     Problem: Chronic Conditions and Co-morbidities  Goal: Patient's chronic conditions and co-morbidity symptoms are monitored and maintained or improved  Outcome: Progressing     Problem: Nutrition  Goal: Nutrient intake appropriate for maintaining nutritional needs  Outcome: Progressing

## 2025-03-10 ENCOUNTER — TELEPHONE (OUTPATIENT)
Facility: CLINIC | Age: OVER 89
End: 2025-03-10
Payer: MEDICARE

## 2025-03-10 NOTE — TELEPHONE ENCOUNTER
Patient in office, was a former patient of Dr. Gleason. Recent discharge from hospital. He is scheduled for follow up with Dr. Johnson. 3/19, however in the meantime he is asking if it is necessary for him to take the atorvastatin. They added a statin at the hospital and he has not been on it before. I advised patient to continue taking medication as instructed at discharge but would message Dr. Johnson regarding the atorvastatin

## 2025-03-12 NOTE — TELEPHONE ENCOUNTER
Called patient, advised him that Dr. Johnson agrees to continue medications instructed on discharge papers until he comes in to see Dr. Johnson. Asked if he was having side effects and he said no. However he had a nose bleed this morning but has since stopped bleeding. Advised patient to call office if he experiences any more bleeding, or if significant, go to ER.

## 2025-03-19 ENCOUNTER — OFFICE VISIT (OUTPATIENT)
Facility: CLINIC | Age: OVER 89
End: 2025-03-19
Payer: MEDICARE

## 2025-03-26 ENCOUNTER — OFFICE VISIT (OUTPATIENT)
Facility: CLINIC | Age: OVER 89
End: 2025-03-26
Payer: MEDICARE

## 2025-03-26 VITALS
WEIGHT: 159 LBS | SYSTOLIC BLOOD PRESSURE: 119 MMHG | BODY MASS INDEX: 21.54 KG/M2 | DIASTOLIC BLOOD PRESSURE: 68 MMHG | HEART RATE: 47 BPM | OXYGEN SATURATION: 100 %

## 2025-03-26 DIAGNOSIS — I34.0 MITRAL VALVE INSUFFICIENCY, UNSPECIFIED ETIOLOGY: Primary | ICD-10-CM

## 2025-03-26 PROCEDURE — 3074F SYST BP LT 130 MM HG: CPT | Performed by: INTERNAL MEDICINE

## 2025-03-26 PROCEDURE — G2211 COMPLEX E/M VISIT ADD ON: HCPCS | Performed by: INTERNAL MEDICINE

## 2025-03-26 PROCEDURE — 99204 OFFICE O/P NEW MOD 45 MIN: CPT | Performed by: INTERNAL MEDICINE

## 2025-03-26 PROCEDURE — 99214 OFFICE O/P EST MOD 30 MIN: CPT | Performed by: INTERNAL MEDICINE

## 2025-03-26 PROCEDURE — 1126F AMNT PAIN NOTED NONE PRSNT: CPT | Performed by: INTERNAL MEDICINE

## 2025-03-26 PROCEDURE — 1159F MED LIST DOCD IN RCRD: CPT | Performed by: INTERNAL MEDICINE

## 2025-03-26 PROCEDURE — 3078F DIAST BP <80 MM HG: CPT | Performed by: INTERNAL MEDICINE

## 2025-03-26 ASSESSMENT — PATIENT HEALTH QUESTIONNAIRE - PHQ9
SUM OF ALL RESPONSES TO PHQ9 QUESTIONS 1 AND 2: 0
1. LITTLE INTEREST OR PLEASURE IN DOING THINGS: NOT AT ALL
2. FEELING DOWN, DEPRESSED OR HOPELESS: NOT AT ALL

## 2025-03-26 ASSESSMENT — ENCOUNTER SYMPTOMS
OCCASIONAL FEELINGS OF UNSTEADINESS: 0
LOSS OF SENSATION IN FEET: 0
DEPRESSION: 0

## 2025-03-26 ASSESSMENT — PAIN SCALES - GENERAL: PAINLEVEL_OUTOF10: 0-NO PAIN

## 2025-03-26 ASSESSMENT — LIFESTYLE VARIABLES: TOTAL SCORE: 0

## 2025-03-26 NOTE — PROGRESS NOTES
[facility]    Interventional Cardiology    Visit Type: New Patient       Reason for Visit: Med Refill             Assessment and Plan  ?       Alvin Whitmore is a very pleasant 89 y.o. male who presents for establishing with a cardiologist.    He does not want to make any major changes today.  He is not interested in a MICHAEL at this time to workup his mitral regurgitation.  He is not necessarily interested in transcatheter [id number] repair of his mitral valve.  We will keep him on his current aspirin and half dose apixaban.  He will contact our office if his symptoms worsen and he is interested in further workup.  Otherwise we will repeat an echocardiogram in 6 months.    Problem List Items Addressed This Visit    None  Visit Diagnoses       Mitral valve insufficiency, unspecified etiology    -  Primary    Relevant Orders    Follow Up In Cardiology    Transthoracic echo (TTE) complete                  Follow up: 6 months        It was a great pleasure seeing Alvin Whitmore in our office today.  Please contact me with any questions.       Harpal Johnson MD MPH  Interventional Cardiologist/[facility]    Subjective  ?       Alvin Whitmore is a very pleasant 89 y.o. male with Atrial fibrillation, history of stroke and TIAs, hypertension, hyperlipidemia, mitral regurgitation who presents to establish with a cardiologist.  He was formally a patient of Dr. Mauro Gleason.  He was on warfarin for years for his atrial fibrillation and was switched to Eliquis.  He was doing relatively well on Eliquis with no major bleeding issues.  He was admitted to the hospital earlier this month with a TIA.  He had some vision changes consistent with amaurosis fugax.  His apixaban was cut in half and he was started on aspirin.  He has had no bleeding issues on this regimen.  Echocardiogram revealed a normal ejection  fraction with moderate to severe mitral regurgitation.  He denies any significant dyspnea, orthopnea.  He has 1+ edema which has been stable.  He denies any angina.  He denies any lightheadedness or syncope.  He does not want any further workup of his mitral regurgitation right now.  He states that he is 89 years old and has lived long enough.  He denies suicidal ideation.           Review of Systems   Constitutional:  Negative for chills, fatigue and fever.   HENT:  Negative for nosebleeds.    Eyes:  Positive for visual disturbance.   Respiratory:  Negative for shortness of breath.    Cardiovascular:  Positive for leg swelling. Negative for chest pain and palpitations.   Gastrointestinal:  Negative for blood in stool.   Genitourinary:  Negative for hematuria.   Skin:  Negative for wound.   Neurological:  Negative for dizziness, syncope and light-headedness.          No Known Allergies      Outpatient Medications Prior to Visit   Medication Sig Dispense Refill    apixaban (Eliquis) 5 mg tablet Take 0.5 tablets (2.5 mg) by mouth 2 times a day.      aspirin 81 mg EC tablet Take 1 tablet (81 mg) by mouth once daily.      atorvastatin (Lipitor) 40 mg tablet Take 1 tablet (40 mg) by mouth once daily at bedtime. 30 tablet 0    fish oil concentrate (Omega-3) 120-180 mg capsule Take 1 capsule (1 g) by mouth once daily.      glucosamine HCl/chondroitin ojeda (GLUCOSAMINE-CHONDROITIN ORAL) Take 1 tablet by mouth 2 times a day.      ketoconazole (NIZOral) 2 % cream Apply 1 Application topically 2 times a day as needed for itching. GROIN      lubricating eye drops ophthalmic solution Administer 1 drop into both eyes if needed for dry eyes.      metoprolol tartrate (Lopressor) 25 mg tablet Take 0.5 tablets (12.5 mg) by mouth 2 times a day. With food      metroNIDAZOLE (Metrogel) 0.75 % gel 1 Application 2 times a day. prn      pantoprazole (ProtoNix) 40 mg EC tablet Take 1 tablet (40 mg) by mouth once daily in the morning. Take  before meals. Do not crush, chew, or split. 30 tablet 0     No facility-administered medications prior to visit.          Past Medical History:   Diagnosis Date    Atrial fibrillation (Multi)     Cancer (Multi)     Hypertension     TIA (transient ischemic attack) 1996          Past Surgical History:   Procedure Laterality Date    MR HEAD ANGIO WO IV CONTRAST  2017    MR HEAD ANGIO WO IV CONTRAST [facility]         Social History     Tobacco Use    Smoking status: Former     Types: Cigarettes    Smokeless tobacco: Never   Substance Use Topics    Alcohol use: Yes     Alcohol/week: 2.0 standard drinks of alcohol     Types: 2 Glasses of wine per week          Family History   Problem Relation Name Age of Onset    No Known Problems Mother      No Known Problems Father             Objective  ?       Vitals:    25 1420   BP: 119/68   Pulse: (!) 47   SpO2: 100%   Weight: 72.1 kg (159 lb)      Body mass index is 21.54 kg/m².      Physical Exam  Constitutional:       General: He is not in acute distress.     Appearance: Normal appearance.   HENT:      Head: Normocephalic and atraumatic.      Mouth/Throat:      Mouth: Mucous membranes are moist.   Neck:      Vascular: No carotid bruit or JVD.   Cardiovascular:      Rate and Rhythm: Normal rate. Rhythm irregularly irregular.      Pulses:           Dorsalis pedis pulses are 2+ on the right side and 2+ on the left side.        Posterior tibial pulses are 2+ on the right side and 2+ on the left side.      Heart sounds: Murmur heard.      Crescendo systolic murmur is present with a grade of 2/6.   Pulmonary:      Effort: Pulmonary effort is normal.      Breath sounds: Normal breath sounds. No wheezing or rales.   Musculoskeletal:      Right lower le+ Edema present.      Left lower le+ Edema present.   Skin:     General: Skin is warm.   Neurological:      Mental Status: He is alert and oriented to person, place, and time. Mental status is at  baseline.   Psychiatric:         Mood and Affect: Mood normal.         Behavior: Behavior normal.                Data Reviewed and Summarized  ?       Labs: personally reviewed   Lab Results   Component Value Date    WBC 6.6 03/08/2025    HGB 11.3 (L) 03/08/2025    HCT 32.6 (L) 03/08/2025    MCV 91 03/08/2025     03/08/2025      Lab Results   Component Value Date    GLUCOSE 83 03/08/2025    CALCIUM 8.5 (L) 03/08/2025     03/08/2025    K 4.0 03/08/2025    CO2 27 03/08/2025     03/08/2025    BUN 19 03/08/2025    CREATININE 0.81 03/08/2025      [facility]   Lab Results   Component Value Date    CHOL 157 03/06/2025    CHOL 162 11/10/2023      Lab Results   Component Value Date    TRIG 81 03/06/2025    TRIG 95 11/10/2023      Lab Results   Component Value Date    HDL 52.8 03/06/2025    HDL 55.0 11/10/2023      Lab Results   Component Value Date    LDLCALC 88 03/06/2025    LDLCALC 88 11/10/2023         Imaging/Testing: personally reviewed   Lab Results   Component Value Date    LVEF 59 03/07/2025

## 2025-03-28 ASSESSMENT — ENCOUNTER SYMPTOMS
PALPITATIONS: 0
LIGHT-HEADEDNESS: 0
BLOOD IN STOOL: 0
SHORTNESS OF BREATH: 0
DIZZINESS: 0
FEVER: 0
HEMATURIA: 0
CHILLS: 0
WOUND: 0
FATIGUE: 0

## 2025-04-12 ENCOUNTER — APPOINTMENT (OUTPATIENT)
Dept: RADIOLOGY | Facility: HOSPITAL | Age: OVER 89
End: 2025-04-12
Payer: MEDICARE

## 2025-04-12 ENCOUNTER — HOSPITAL ENCOUNTER (EMERGENCY)
Facility: HOSPITAL | Age: OVER 89
Discharge: HOME | End: 2025-04-12
Payer: MEDICARE

## 2025-04-12 VITALS
SYSTOLIC BLOOD PRESSURE: 147 MMHG | OXYGEN SATURATION: 97 % | HEIGHT: 78 IN | RESPIRATION RATE: 18 BRPM | DIASTOLIC BLOOD PRESSURE: 68 MMHG | HEART RATE: 70 BPM | WEIGHT: 155 LBS | BODY MASS INDEX: 17.93 KG/M2 | TEMPERATURE: 97.7 F

## 2025-04-12 DIAGNOSIS — M25.462 EFFUSION OF LEFT KNEE: ICD-10-CM

## 2025-04-12 DIAGNOSIS — M25.562 ACUTE PAIN OF LEFT KNEE: Primary | ICD-10-CM

## 2025-04-12 LAB
ANION GAP SERPL CALCULATED.3IONS-SCNC: 12 MMOL/L (ref 10–20)
BASOPHILS # BLD AUTO: 0.09 X10*3/UL (ref 0–0.1)
BASOPHILS NFR BLD AUTO: 1.3 %
BUN SERPL-MCNC: 23 MG/DL (ref 6–23)
CALCIUM SERPL-MCNC: 9.1 MG/DL (ref 8.6–10.3)
CHLORIDE SERPL-SCNC: 102 MMOL/L (ref 98–107)
CO2 SERPL-SCNC: 28 MMOL/L (ref 21–32)
CREAT SERPL-MCNC: 0.71 MG/DL (ref 0.5–1.3)
EGFRCR SERPLBLD CKD-EPI 2021: 86 ML/MIN/1.73M*2
EOSINOPHIL # BLD AUTO: 0.17 X10*3/UL (ref 0–0.4)
EOSINOPHIL NFR BLD AUTO: 2.4 %
ERYTHROCYTE [DISTWIDTH] IN BLOOD BY AUTOMATED COUNT: 13.8 % (ref 11.5–14.5)
GLUCOSE SERPL-MCNC: 92 MG/DL (ref 74–99)
HCT VFR BLD AUTO: 39.3 % (ref 41–52)
HGB BLD-MCNC: 13 G/DL (ref 13.5–17.5)
IMM GRANULOCYTES # BLD AUTO: 0.02 X10*3/UL (ref 0–0.5)
IMM GRANULOCYTES NFR BLD AUTO: 0.3 % (ref 0–0.9)
LYMPHOCYTES # BLD AUTO: 1.47 X10*3/UL (ref 0.8–3)
LYMPHOCYTES NFR BLD AUTO: 20.7 %
MCH RBC QN AUTO: 31.4 PG (ref 26–34)
MCHC RBC AUTO-ENTMCNC: 33.1 G/DL (ref 32–36)
MCV RBC AUTO: 95 FL (ref 80–100)
MONOCYTES # BLD AUTO: 0.8 X10*3/UL (ref 0.05–0.8)
MONOCYTES NFR BLD AUTO: 11.3 %
NEUTROPHILS # BLD AUTO: 4.55 X10*3/UL (ref 1.6–5.5)
NEUTROPHILS NFR BLD AUTO: 64 %
NRBC BLD-RTO: 0 /100 WBCS (ref 0–0)
PLATELET # BLD AUTO: 191 X10*3/UL (ref 150–450)
POTASSIUM SERPL-SCNC: 4.5 MMOL/L (ref 3.5–5.3)
RBC # BLD AUTO: 4.14 X10*6/UL (ref 4.5–5.9)
SODIUM SERPL-SCNC: 137 MMOL/L (ref 136–145)
WBC # BLD AUTO: 7.1 X10*3/UL (ref 4.4–11.3)

## 2025-04-12 PROCEDURE — 93971 EXTREMITY STUDY: CPT | Performed by: RADIOLOGY

## 2025-04-12 PROCEDURE — 36415 COLL VENOUS BLD VENIPUNCTURE: CPT

## 2025-04-12 PROCEDURE — 80048 BASIC METABOLIC PNL TOTAL CA: CPT

## 2025-04-12 PROCEDURE — 99285 EMERGENCY DEPT VISIT HI MDM: CPT | Mod: 25

## 2025-04-12 PROCEDURE — 85025 COMPLETE CBC W/AUTO DIFF WBC: CPT

## 2025-04-12 PROCEDURE — 93971 EXTREMITY STUDY: CPT

## 2025-04-12 PROCEDURE — 73564 X-RAY EXAM KNEE 4 OR MORE: CPT | Mod: LT

## 2025-04-12 PROCEDURE — 73564 X-RAY EXAM KNEE 4 OR MORE: CPT | Mod: LEFT SIDE | Performed by: RADIOLOGY

## 2025-04-12 RX ORDER — TRAMADOL HYDROCHLORIDE 50 MG/1
50 TABLET ORAL ONCE
Status: DISCONTINUED | OUTPATIENT
Start: 2025-04-12 | End: 2025-04-12 | Stop reason: HOSPADM

## 2025-04-12 RX ORDER — ACETAMINOPHEN 325 MG/1
975 TABLET ORAL ONCE
Status: DISCONTINUED | OUTPATIENT
Start: 2025-04-12 | End: 2025-04-12 | Stop reason: HOSPADM

## 2025-04-12 RX ORDER — TRAMADOL HYDROCHLORIDE 50 MG/1
50 TABLET ORAL EVERY 8 HOURS PRN
Qty: 12 TABLET | Refills: 0 | Status: SHIPPED | OUTPATIENT
Start: 2025-04-12 | End: 2025-04-17

## 2025-04-12 ASSESSMENT — COLUMBIA-SUICIDE SEVERITY RATING SCALE - C-SSRS
1. IN THE PAST MONTH, HAVE YOU WISHED YOU WERE DEAD OR WISHED YOU COULD GO TO SLEEP AND NOT WAKE UP?: NO
1. IN THE PAST MONTH, HAVE YOU WISHED YOU WERE DEAD OR WISHED YOU COULD GO TO SLEEP AND NOT WAKE UP?: NO
2. HAVE YOU ACTUALLY HAD ANY THOUGHTS OF KILLING YOURSELF?: NO
6. HAVE YOU EVER DONE ANYTHING, STARTED TO DO ANYTHING, OR PREPARED TO DO ANYTHING TO END YOUR LIFE?: NO
6. HAVE YOU EVER DONE ANYTHING, STARTED TO DO ANYTHING, OR PREPARED TO DO ANYTHING TO END YOUR LIFE?: NO
2. HAVE YOU ACTUALLY HAD ANY THOUGHTS OF KILLING YOURSELF?: NO

## 2025-04-12 ASSESSMENT — PAIN - FUNCTIONAL ASSESSMENT: PAIN_FUNCTIONAL_ASSESSMENT: 0-10

## 2025-04-12 ASSESSMENT — PAIN DESCRIPTION - LOCATION: LOCATION: KNEE

## 2025-04-12 ASSESSMENT — PAIN SCALES - GENERAL: PAINLEVEL_OUTOF10: 7

## 2025-04-12 NOTE — DISCHARGE INSTRUCTIONS
Please utilize tramadol as needed.  I would recommend rest, ice, compression and elevation. Adjust Ace wrap as you need.    Be sure to take all medications, over the counter medications or prescription medications only as directed.    Be sure to follow up as directed in 1-2 days. All of the details of your follow up instructions are detailed in the follow up section of this packet.    If you are being discharged with any pains medications or muscle relaxers (norco, Vicodin, hydrocodone products, Percocet, oxycodone products, flexeril, cyclobenzaprine, robaxin, norflex, brand or generic, or any other pain controlling medications with the exception of Ibuprofen and regular Tylenol, do not drive or operate machinery, climb ladders or participate in any activity that could potentially put yourself or others at risk should you get dizzy, or be/feel impaired at all.    Return to emergency room without delay for ANY new or worsening pains or for any other symptoms or concerns. Return with worsening pains, nausea, vomiting, trouble breathing, palpitations, shortness of breath, inability to pass stool or urine, loss of control of stool or urine, any numbness or tingling (that is not normal for you), uncontrolled fevers, the passing of blood or other material in stool or urine, rashes, pains or for any other symptoms or concerns you may have. You are always welcome to return to the ER at any time for any reason or for any other concerns you may have.

## 2025-04-12 NOTE — ED PROVIDER NOTES
HPI   Chief Complaint   Patient presents with    Knee Pain     Pt has swollen left knee       Patient is a 89-year-old male with past medical history of atrial fibrillation on blood thinners, hypertension, TIA presenting with concerns for left knee pain.  Reportedly, 1 week ago he went to  a piece of lawnmower and his left knee pain began since then.  Has been getting more swollen.  He states the swelling does sort of come and go.  He has not taken any over-the-counter medications for the pain.  States that with ambulation, the pain does get worse and it is a challenge to bear weight.  States that his left knee is not warm.  No recent knee surgeries.  Patient denies fevers, chills, cough, sore throat, runny nose, chest pain, shortness of breath, abdominal pain, nausea, vomiting, diarrhea or urinary complaints.              Patient History   Past Medical History:   Diagnosis Date    Atrial fibrillation (Multi)     Cancer (Multi)     Hypertension     TIA (transient ischemic attack) 01/01/1996     Past Surgical History:   Procedure Laterality Date    MR HEAD ANGIO WO IV CONTRAST  12/21/2017    MR HEAD ANGIO WO IV CONTRAST [facility]     Family History   Problem Relation Name Age of Onset    No Known Problems Mother      No Known Problems Father       Social History     Tobacco Use    Smoking status: Former     Types: Cigarettes    Smokeless tobacco: Never   Vaping Use    Vaping status: Never Used   Substance Use Topics    Alcohol use: Yes     Alcohol/week: 2.0 standard drinks of alcohol     Types: 2 Glasses of wine per week    Drug use: Never       Physical Exam   ED Triage Vitals   Temp Pulse Resp BP   -- -- -- --      SpO2 Temp src Heart Rate Source Patient Position   -- -- -- --      BP Location FiO2 (%)     -- --       Physical Exam  Vitals and nursing note reviewed.   Constitutional:       Appearance: He is well-developed.      Comments: Awake, laying in examination bed   HENT:      Head:  Normocephalic and atraumatic.      Nose: Nose normal.      Mouth/Throat:      Mouth: Mucous membranes are moist.      Pharynx: Oropharynx is clear.   Eyes:      Extraocular Movements: Extraocular movements intact.      Conjunctiva/sclera: Conjunctivae normal.      Pupils: Pupils are equal, round, and reactive to light.   Cardiovascular:      Rate and Rhythm: Normal rate and regular rhythm.      Pulses: Normal pulses.      Heart sounds: Normal heart sounds. No murmur heard.  Pulmonary:      Effort: Pulmonary effort is normal. No respiratory distress.      Breath sounds: Normal breath sounds.   Abdominal:      General: Abdomen is flat.      Palpations: Abdomen is soft.      Tenderness: There is no abdominal tenderness.   Musculoskeletal:         General: No swelling.      Cervical back: Normal range of motion and neck supple.      Comments: Pain with flexion of the left knee.  Swelling in the bilateral lower extremities, chronic appearing   Skin:     General: Skin is warm and dry.      Capillary Refill: Capillary refill takes less than 2 seconds.   Neurological:      General: No focal deficit present.      Mental Status: He is alert and oriented to person, place, and time.   Psychiatric:         Mood and Affect: Mood normal.         Behavior: Behavior normal.           ED Course & MDM   Diagnoses as of 04/12/25 1822   Acute pain of left knee   Effusion of left knee                 No data recorded     [place] Coma Scale Score: 15 (04/12/25 1515 : Alyssa Jackson RN)                           Medical Decision Making  Patient is a 89-year-old male with past medical history of atrial fibrillation on blood thinners, hypertension, TIA presented with concerns for left knee pain.  X-ray ordered.  Tylenol ordered.  Patient is requesting ultrasound to rule out DVT.  Ultrasound ordered.  Conditions considered include but are not limited: Contusion, fracture, ligamentous injury, DVT.    Attending physician was available for  consultation of this patient.  X-ray shows moderate joint effusion and also concerning signs for lymphedema versus cellulitis.  Physical exam is not appreciable for cellulitis.  Due to bilateral lower extremity swelling, more concerning for lymphedema.  CBC is without leukocytosis but does show signs of anemia hemoglobin of 15.0.  BMP without significant electrolyte abnormality or renal impairment.  Ultrasound negative for DVT.    Patient successfully passed trial of ambulation.  Prescription for tramadol written for home.  Encourage patient utilize over-the-counter medications and prescription medications for pain control.  We discussed RICE: Rest, ice, compression, elevation.  I believe this patient is at low risk for complication, and a disposition of discharge is acceptable.  Return to the Emergency Department if new or worsening symptoms including headache, fever, chills, chest pain, shortness of breath, syncope, near syncope, abdominal pain, nausea, vomiting,  diarrhea, or worsening pain.  Patient is agreeable to a disposition of discharge and to follow-up with respective fields in the next several days.    Portions of this note made with Dragon software, please be mindful of potential grammatical errors.      Medications - No data to display  Labs Reviewed   CBC WITH AUTO DIFFERENTIAL - Abnormal       Result Value    WBC 7.1      nRBC 0.0      RBC 4.14 (*)     Hemoglobin 13.0 (*)     Hematocrit 39.3 (*)     MCV 95      MCH 31.4      MCHC 33.1      RDW 13.8      Platelets 191      Neutrophils % 64.0      Immature Granulocytes %, Automated 0.3      Lymphocytes % 20.7      Monocytes % 11.3      Eosinophils % 2.4      Basophils % 1.3      Neutrophils Absolute 4.55      Immature Granulocytes Absolute, Automated 0.02      Lymphocytes Absolute 1.47      Monocytes Absolute 0.80      Eosinophils Absolute 0.17      Basophils Absolute 0.09     BASIC METABOLIC PANEL - Normal    Glucose 92      Sodium 137      Potassium  4.5      Chloride 102      Bicarbonate 28      Anion Gap 12      Urea Nitrogen 23      Creatinine 0.71      eGFR 86      Calcium 9.1       Lower extremity venous duplex left   Final Result   No sonographic evidence for deep vein thrombosis within the evaluated   veins of the left lower extremity.        MACRO:   None        Signed by: Roland Whitmore 4/12/2025 2:53 PM   Dictation workstation:   [id number]      XR knee left 4+ views   Final Result   1. Moderate nonspecific knee joint effusion without osseous injury   evident.   2. Reticular increased density in the subcutaneous tissues which   could represent lymphedema and/or cellulitis.        MACRO:   None        Signed by: Jai Jimenez 4/12/2025 2:30 PM   Dictation workstation:   [id number]            Procedure  Procedures     Amando Dudley PA-C  04/12/25 1822

## 2025-08-04 ENCOUNTER — APPOINTMENT (OUTPATIENT)
Facility: CLINIC | Age: OVER 89
End: 2025-08-04
Payer: MEDICARE

## 2025-08-11 ENCOUNTER — TELEPHONE (OUTPATIENT)
Facility: CLINIC | Age: OVER 89
End: 2025-08-11
Payer: MEDICARE

## 2025-10-01 ENCOUNTER — APPOINTMENT (OUTPATIENT)
Dept: CARDIOLOGY | Facility: CLINIC | Age: OVER 89
End: 2025-10-01
Payer: MEDICARE